# Patient Record
Sex: MALE | Race: WHITE | Employment: OTHER | URBAN - METROPOLITAN AREA
[De-identification: names, ages, dates, MRNs, and addresses within clinical notes are randomized per-mention and may not be internally consistent; named-entity substitution may affect disease eponyms.]

---

## 2017-08-07 RX ORDER — FLUOXETINE 10 MG/1
CAPSULE ORAL
Qty: 90 CAPSULE | Refills: 3 | Status: SHIPPED | OUTPATIENT
Start: 2017-08-07 | End: 2018-08-13 | Stop reason: SDUPTHER

## 2018-03-15 ENCOUNTER — HOSPITAL ENCOUNTER (OUTPATIENT)
Dept: VASCULAR LAB | Age: 83
Discharge: HOME | End: 2018-03-15
Attending: INTERNAL MEDICINE | Admitting: INTERNAL MEDICINE

## 2018-03-15 DIAGNOSIS — R23.0 CYANOSIS: ICD-10-CM

## 2018-04-20 ENCOUNTER — TELEPHONE (OUTPATIENT)
Dept: CASE MANAGEMENT | Age: 83
End: 2018-04-20

## 2018-08-13 RX ORDER — FLUOXETINE 10 MG/1
CAPSULE ORAL
Qty: 90 CAPSULE | Refills: 3 | Status: SHIPPED | OUTPATIENT
Start: 2018-08-13 | End: 2019-03-25

## 2019-03-25 ENCOUNTER — OFFICE VISIT (OUTPATIENT)
Dept: FAMILY MEDICINE CLINIC | Age: 84
End: 2019-03-25
Payer: MEDICARE

## 2019-03-25 VITALS
HEIGHT: 71 IN | WEIGHT: 140.38 LBS | OXYGEN SATURATION: 94 % | HEART RATE: 68 BPM | BODY MASS INDEX: 19.65 KG/M2 | DIASTOLIC BLOOD PRESSURE: 60 MMHG | SYSTOLIC BLOOD PRESSURE: 122 MMHG

## 2019-03-25 DIAGNOSIS — D61.818 PANCYTOPENIA (HCC): ICD-10-CM

## 2019-03-25 DIAGNOSIS — I10 ESSENTIAL HYPERTENSION, BENIGN: Primary | ICD-10-CM

## 2019-03-25 DIAGNOSIS — I25.10 ATHEROSCLEROSIS OF NATIVE CORONARY ARTERY OF NATIVE HEART WITHOUT ANGINA PECTORIS: ICD-10-CM

## 2019-03-25 DIAGNOSIS — E78.2 MIXED HYPERLIPIDEMIA: ICD-10-CM

## 2019-03-25 LAB
A/G RATIO: 1.2 (ref 1.1–2.2)
ACANTHOCYTES: ABNORMAL
ALBUMIN SERPL-MCNC: 4.1 G/DL (ref 3.4–5)
ALP BLD-CCNC: 69 U/L (ref 40–129)
ALT SERPL-CCNC: 16 U/L (ref 10–40)
ANION GAP SERPL CALCULATED.3IONS-SCNC: 10 MMOL/L (ref 3–16)
AST SERPL-CCNC: 29 U/L (ref 15–37)
BASOPHILS ABSOLUTE: 0 K/UL (ref 0–0.2)
BASOPHILS RELATIVE PERCENT: 0.5 %
BILIRUB SERPL-MCNC: 0.6 MG/DL (ref 0–1)
BUN BLDV-MCNC: 24 MG/DL (ref 7–20)
CALCIUM SERPL-MCNC: 9.4 MG/DL (ref 8.3–10.6)
CHLORIDE BLD-SCNC: 104 MMOL/L (ref 99–110)
CHOLESTEROL, TOTAL: 86 MG/DL (ref 0–199)
CO2: 27 MMOL/L (ref 21–32)
CREAT SERPL-MCNC: 1.2 MG/DL (ref 0.8–1.3)
EOSINOPHILS ABSOLUTE: 0 K/UL (ref 0–0.6)
EOSINOPHILS RELATIVE PERCENT: 0.2 %
GFR AFRICAN AMERICAN: >60
GFR NON-AFRICAN AMERICAN: 58
GLOBULIN: 3.3 G/DL
GLUCOSE BLD-MCNC: 94 MG/DL (ref 70–99)
HCT VFR BLD CALC: 40.1 % (ref 40.5–52.5)
HDLC SERPL-MCNC: 43 MG/DL (ref 40–60)
HEMATOLOGY PATH CONSULT: YES
HEMOGLOBIN: 13 G/DL (ref 13.5–17.5)
LDL CHOLESTEROL CALCULATED: 34 MG/DL
LYMPHOCYTES ABSOLUTE: 0.9 K/UL (ref 1–5.1)
LYMPHOCYTES RELATIVE PERCENT: 33.5 %
MCH RBC QN AUTO: 32 PG (ref 26–34)
MCHC RBC AUTO-ENTMCNC: 32.5 G/DL (ref 31–36)
MCV RBC AUTO: 98.3 FL (ref 80–100)
MONOCYTES ABSOLUTE: 0.8 K/UL (ref 0–1.3)
MONOCYTES RELATIVE PERCENT: 32.2 %
NEUTROPHILS ABSOLUTE: 0.9 K/UL (ref 1.7–7.7)
NEUTROPHILS RELATIVE PERCENT: 33.6 %
PDW BLD-RTO: 14.1 % (ref 12.4–15.4)
PLATELET # BLD: 54 K/UL (ref 135–450)
PLATELET SLIDE REVIEW: ABNORMAL
PMV BLD AUTO: 11.3 FL (ref 5–10.5)
POTASSIUM SERPL-SCNC: 4.4 MMOL/L (ref 3.5–5.1)
RBC # BLD: 4.07 M/UL (ref 4.2–5.9)
SLIDE REVIEW: ABNORMAL
SODIUM BLD-SCNC: 141 MMOL/L (ref 136–145)
TOTAL PROTEIN: 7.4 G/DL (ref 6.4–8.2)
TRIGL SERPL-MCNC: 46 MG/DL (ref 0–150)
VLDLC SERPL CALC-MCNC: 9 MG/DL
WBC # BLD: 2.6 K/UL (ref 4–11)

## 2019-03-25 PROCEDURE — G8427 DOCREV CUR MEDS BY ELIG CLIN: HCPCS | Performed by: FAMILY MEDICINE

## 2019-03-25 PROCEDURE — 1036F TOBACCO NON-USER: CPT | Performed by: FAMILY MEDICINE

## 2019-03-25 PROCEDURE — G8484 FLU IMMUNIZE NO ADMIN: HCPCS | Performed by: FAMILY MEDICINE

## 2019-03-25 PROCEDURE — 99214 OFFICE O/P EST MOD 30 MIN: CPT | Performed by: FAMILY MEDICINE

## 2019-03-25 PROCEDURE — 4040F PNEUMOC VAC/ADMIN/RCVD: CPT | Performed by: FAMILY MEDICINE

## 2019-03-25 PROCEDURE — 1123F ACP DISCUSS/DSCN MKR DOCD: CPT | Performed by: FAMILY MEDICINE

## 2019-03-25 PROCEDURE — 36415 COLL VENOUS BLD VENIPUNCTURE: CPT | Performed by: FAMILY MEDICINE

## 2019-03-25 PROCEDURE — G8598 ASA/ANTIPLAT THER USED: HCPCS | Performed by: FAMILY MEDICINE

## 2019-03-25 PROCEDURE — G8420 CALC BMI NORM PARAMETERS: HCPCS | Performed by: FAMILY MEDICINE

## 2019-03-25 ASSESSMENT — PATIENT HEALTH QUESTIONNAIRE - PHQ9
SUM OF ALL RESPONSES TO PHQ QUESTIONS 1-9: 0
1. LITTLE INTEREST OR PLEASURE IN DOING THINGS: 0
SUM OF ALL RESPONSES TO PHQ QUESTIONS 1-9: 0
SUM OF ALL RESPONSES TO PHQ9 QUESTIONS 1 & 2: 0
2. FEELING DOWN, DEPRESSED OR HOPELESS: 0

## 2019-03-25 NOTE — PROGRESS NOTES
Chief Complaint   Patient presents with    Hyperlipidemia    Hypertension     Family History   Problem Relation Age of Onset    Cancer Mother         breast cancer    Coronary Art Dis Father     Cancer Brother         thyroid     Social History     Socioeconomic History    Marital status:      Spouse name: Roxy Cruz Number of children: 6    Years of education: Not on file    Highest education level: Not on file   Occupational History    Occupation: writing   Social Needs    Financial resource strain: Not on file    Food insecurity:     Worry: Not on file     Inability: Not on file    Transportation needs:     Medical: Not on file     Non-medical: Not on file   Tobacco Use    Smoking status: Never Smoker    Smokeless tobacco: Never Used   Substance and Sexual Activity    Alcohol use: No    Drug use: Not on file    Sexual activity: Not on file   Lifestyle    Physical activity:     Days per week: Not on file     Minutes per session: Not on file    Stress: Not on file   Relationships    Social connections:     Talks on phone: Not on file     Gets together: Not on file     Attends Hindu service: Not on file     Active member of club or organization: Not on file     Attends meetings of clubs or organizations: Not on file     Relationship status: Not on file    Intimate partner violence:     Fear of current or ex partner: Not on file     Emotionally abused: Not on file     Physically abused: Not on file     Forced sexual activity: Not on file   Other Topics Concern    Not on file   Social History Narrative    Not on file       Current Outpatient Medications:     aspirin 81 MG EC tablet, Take 81 mg by mouth daily. , Disp: , Rfl:     metoprolol (LOPRESSOR) 25 MG tablet, Take 25 mg by mouth 2 times daily.  1/2 by mouth twice a day , Disp: , Rfl:     simvastatin (ZOCOR) 20 MG tablet, Take 20 mg by mouth nightly.  , Disp: , Rfl:   Allergies   Allergen Reactions    Altace [Ramipril] Patient Active Problem List   Diagnosis    Osteoarthritis of multiple joints    Pain in joint, shoulder region    Elevated prostate specific antigen (PSA)    Dyshidrosis    Essential hypertension, benign    Coronary atherosclerosis of native coronary artery    Allergic rhinitis due to other allergen    Mixed hyperlipidemia    Sleep apnea    Pancytopenia (HCC)    Macrocytosis    Thrombocytopenia (HCC)       HPI / ROS: Asher Rivas presents for evaluation and management of :    # CAD denies CP/SOB  # pancytopenia follows Dr. Victorino Michaud        Objective   Wt Readings from Last 3 Encounters:   03/25/19 140 lb 6 oz (63.7 kg)   07/18/17 131 lb (59.4 kg)   03/14/17 134 lb (60.8 kg)       A&O  /60   Pulse 68   Ht 5' 10.86\" (1.8 m)   Wt 140 lb 6 oz (63.7 kg)   SpO2 94%   BMI 19.66 kg/m²   Eyes no scleral icterus  Skin no rash no jaundice  Neck no TMG no LAD  Car reg no MGR  Lungs CTA  abd benign soft  Ext no pitting edema  Psych: Judgement and insight are intact, no pressured speech; no psychomotor retardation or agitation; affect and mood congruent     Diagnosis Orders   1. Essential hypertension, benign  Comprehensive Metabolic Panel    at goal check renal   2. Atherosclerosis of native coronary artery of native heart without angina pectoris      stable cont risk reducing meds   3. Mixed hyperlipidemia  Comprehensive Metabolic Panel    Lipid Panel    LFts lipids on statin   4.  Pancytopenia (HCC)  CBC Auto Differential    repeat CBC and cont woth hem onc as needed     Orders Placed This Encounter   Procedures    Comprehensive Metabolic Panel    Lipid Panel     Order Specific Question:   Is Patient Fasting?/# of Hours     Answer:   yes    CBC Auto Differential    Path Review, Smear

## 2019-03-26 ENCOUNTER — TELEPHONE (OUTPATIENT)
Dept: FAMILY MEDICINE CLINIC | Age: 84
End: 2019-03-26

## 2019-03-26 LAB — HEMATOLOGY PATH CONSULT: NORMAL

## 2019-04-26 ENCOUNTER — TELEPHONE (OUTPATIENT)
Dept: FAMILY MEDICINE CLINIC | Age: 84
End: 2019-04-26

## 2019-08-26 ENCOUNTER — OFFICE VISIT (OUTPATIENT)
Dept: FAMILY MEDICINE CLINIC | Age: 84
End: 2019-08-26
Payer: MEDICARE

## 2019-08-26 VITALS
DIASTOLIC BLOOD PRESSURE: 66 MMHG | HEART RATE: 50 BPM | WEIGHT: 142.4 LBS | BODY MASS INDEX: 19.94 KG/M2 | OXYGEN SATURATION: 97 % | HEIGHT: 71 IN | SYSTOLIC BLOOD PRESSURE: 126 MMHG

## 2019-08-26 DIAGNOSIS — I25.10 ATHEROSCLEROSIS OF NATIVE CORONARY ARTERY OF NATIVE HEART WITHOUT ANGINA PECTORIS: ICD-10-CM

## 2019-08-26 DIAGNOSIS — I10 ESSENTIAL HYPERTENSION, BENIGN: Primary | ICD-10-CM

## 2019-08-26 DIAGNOSIS — Z23 NEED FOR VACCINATION WITH 13-POLYVALENT PNEUMOCOCCAL CONJUGATE VACCINE: ICD-10-CM

## 2019-08-26 DIAGNOSIS — E78.2 MIXED HYPERLIPIDEMIA: ICD-10-CM

## 2019-08-26 PROCEDURE — 1036F TOBACCO NON-USER: CPT | Performed by: FAMILY MEDICINE

## 2019-08-26 PROCEDURE — G8427 DOCREV CUR MEDS BY ELIG CLIN: HCPCS | Performed by: FAMILY MEDICINE

## 2019-08-26 PROCEDURE — 90670 PCV13 VACCINE IM: CPT | Performed by: FAMILY MEDICINE

## 2019-08-26 PROCEDURE — G8420 CALC BMI NORM PARAMETERS: HCPCS | Performed by: FAMILY MEDICINE

## 2019-08-26 PROCEDURE — G0009 ADMIN PNEUMOCOCCAL VACCINE: HCPCS | Performed by: FAMILY MEDICINE

## 2019-08-26 PROCEDURE — 4040F PNEUMOC VAC/ADMIN/RCVD: CPT | Performed by: FAMILY MEDICINE

## 2019-08-26 PROCEDURE — 99214 OFFICE O/P EST MOD 30 MIN: CPT | Performed by: FAMILY MEDICINE

## 2019-08-26 PROCEDURE — 1123F ACP DISCUSS/DSCN MKR DOCD: CPT | Performed by: FAMILY MEDICINE

## 2019-08-26 PROCEDURE — 36415 COLL VENOUS BLD VENIPUNCTURE: CPT | Performed by: FAMILY MEDICINE

## 2019-08-26 PROCEDURE — G8598 ASA/ANTIPLAT THER USED: HCPCS | Performed by: FAMILY MEDICINE

## 2019-08-26 NOTE — PROGRESS NOTES
[Ramipril]        Patient Active Problem List   Diagnosis    Osteoarthritis of multiple joints    Pain in joint, shoulder region    Elevated prostate specific antigen (PSA)    Dyshidrosis    Essential hypertension, benign    Coronary atherosclerosis of native coronary artery    Allergic rhinitis due to other allergen    Mixed hyperlipidemia    Sleep apnea    Pancytopenia ([address]    Macrocytosis       HPI / ROS: Chidi Handley presents for evaluation and management of :    # CAD denies CP/SOB had appt Dr. Linda Friedman last WED noting changes \"he seemed pleased\"    # pancytopenia follows Dr. Damon Calloway  # HTN eric meds no CP/SOB  # hyperlipidemia on statin lipids UTD  Lab Results   Component Value Date    LDLCALC 34 03/25/2019         Objective   Wt Readings from Last 3 Encounters:   08/26/19 142 lb 6.4 oz (64.6 kg)   03/25/19 140 lb 6 oz (63.7 kg)   07/18/17 131 lb (59.4 kg)       A&O  /66   Pulse 50   Ht 5' 11\" (1.803 m)   Wt 142 lb 6.4 oz (64.6 kg)   SpO2 97%   BMI 19.86 kg/m²   Eyes no scleral icterus  Skin no rash no jaundice  Neck no TMG no LAD  Car reg no MGR  Lungs CTA  abd benign soft  Ext no pitting edema  Psych: Judgement and insight are intact, no pressured speech; no psychomotor retardation or agitation; affect and mood congruent     Diagnosis Orders   1. Essential hypertension, benign  Comprehensive Metabolic Panel    at goal age > [de-identified] check renal   2. Mixed hyperlipidemia  Comprehensive Metabolic Panel    Lipid Panel    LFTs on statin continue   3. Atherosclerosis of native coronary artery of native heart without angina pectoris      stable cont risk reducing meds   4.  Need for vaccination with 13-polyvalent pneumococcal conjugate vaccine  PREVNAR 13 IM (Pneumococcal conjugate vaccine 13-valent)     Orders Placed This Encounter   Procedures    PREVNAR 13 IM (Pneumococcal conjugate vaccine 13-valent)    Comprehensive Metabolic Panel    Lipid Panel     Order Specific Question:   Is

## 2019-08-27 LAB
A/G RATIO: 1.7 (ref 1.1–2.2)
ALBUMIN SERPL-MCNC: 4.3 G/DL (ref 3.4–5)
ALP BLD-CCNC: 57 U/L (ref 40–129)
ALT SERPL-CCNC: 22 U/L (ref 10–40)
ANION GAP SERPL CALCULATED.3IONS-SCNC: 12 MMOL/L (ref 3–16)
AST SERPL-CCNC: 26 U/L (ref 15–37)
BILIRUB SERPL-MCNC: 0.4 MG/DL (ref 0–1)
BUN BLDV-MCNC: 29 MG/DL (ref 7–20)
CALCIUM SERPL-MCNC: 9.4 MG/DL (ref 8.3–10.6)
CHLORIDE BLD-SCNC: 109 MMOL/L (ref 99–110)
CHOLESTEROL, TOTAL: 78 MG/DL (ref 0–199)
CO2: 24 MMOL/L (ref 21–32)
CREAT SERPL-MCNC: 1.3 MG/DL (ref 0.8–1.3)
GFR AFRICAN AMERICAN: >60
GFR NON-AFRICAN AMERICAN: 53
GLOBULIN: 2.6 G/DL
GLUCOSE BLD-MCNC: 98 MG/DL (ref 70–99)
HDLC SERPL-MCNC: 42 MG/DL (ref 40–60)
LDL CHOLESTEROL CALCULATED: 26 MG/DL
POTASSIUM SERPL-SCNC: 5.4 MMOL/L (ref 3.5–5.1)
SODIUM BLD-SCNC: 145 MMOL/L (ref 136–145)
TOTAL PROTEIN: 6.9 G/DL (ref 6.4–8.2)
TRIGL SERPL-MCNC: 52 MG/DL (ref 0–150)
VLDLC SERPL CALC-MCNC: 10 MG/DL

## 2020-01-14 ENCOUNTER — OFFICE VISIT (OUTPATIENT)
Dept: FAMILY MEDICINE CLINIC | Age: 85
End: 2020-01-14
Payer: MEDICARE

## 2020-01-14 VITALS
SYSTOLIC BLOOD PRESSURE: 148 MMHG | BODY MASS INDEX: 19.43 KG/M2 | DIASTOLIC BLOOD PRESSURE: 81 MMHG | HEIGHT: 71 IN | OXYGEN SATURATION: 98 % | RESPIRATION RATE: 17 BRPM | HEART RATE: 74 BPM | WEIGHT: 138.8 LBS

## 2020-01-14 LAB
A/G RATIO: 1.9 (ref 1.1–2.2)
ALBUMIN SERPL-MCNC: 5 G/DL (ref 3.4–5)
ALP BLD-CCNC: 60 U/L (ref 40–129)
ALT SERPL-CCNC: 21 U/L (ref 10–40)
ANION GAP SERPL CALCULATED.3IONS-SCNC: 16 MMOL/L (ref 3–16)
AST SERPL-CCNC: 31 U/L (ref 15–37)
BASOPHILS ABSOLUTE: 0 K/UL (ref 0–0.2)
BASOPHILS RELATIVE PERCENT: 0.6 %
BILIRUB SERPL-MCNC: 0.8 MG/DL (ref 0–1)
BUN BLDV-MCNC: 26 MG/DL (ref 7–20)
CALCIUM SERPL-MCNC: 9.7 MG/DL (ref 8.3–10.6)
CHLORIDE BLD-SCNC: 102 MMOL/L (ref 99–110)
CO2: 23 MMOL/L (ref 21–32)
CREAT SERPL-MCNC: 1.1 MG/DL (ref 0.8–1.3)
EOSINOPHILS ABSOLUTE: 0 K/UL (ref 0–0.6)
EOSINOPHILS RELATIVE PERCENT: 0.4 %
GFR AFRICAN AMERICAN: >60
GFR NON-AFRICAN AMERICAN: >60
GLOBULIN: 2.7 G/DL
GLUCOSE BLD-MCNC: 132 MG/DL (ref 70–99)
HCT VFR BLD CALC: 37.6 % (ref 40.5–52.5)
HEMOGLOBIN: 12.5 G/DL (ref 13.5–17.5)
LYMPHOCYTES ABSOLUTE: 0.7 K/UL (ref 1–5.1)
LYMPHOCYTES RELATIVE PERCENT: 30 %
MCH RBC QN AUTO: 33.2 PG (ref 26–34)
MCHC RBC AUTO-ENTMCNC: 33.2 G/DL (ref 31–36)
MCV RBC AUTO: 99.9 FL (ref 80–100)
MONOCYTES ABSOLUTE: 0.4 K/UL (ref 0–1.3)
MONOCYTES RELATIVE PERCENT: 17.2 %
NEUTROPHILS ABSOLUTE: 1.2 K/UL (ref 1.7–7.7)
NEUTROPHILS RELATIVE PERCENT: 51.8 %
PDW BLD-RTO: 13.8 % (ref 12.4–15.4)
PLATELET # BLD: 46 K/UL (ref 135–450)
PMV BLD AUTO: 11.6 FL (ref 5–10.5)
POTASSIUM SERPL-SCNC: 4.5 MMOL/L (ref 3.5–5.1)
RBC # BLD: 3.76 M/UL (ref 4.2–5.9)
SLIDE REVIEW: ABNORMAL
SODIUM BLD-SCNC: 141 MMOL/L (ref 136–145)
TOTAL PROTEIN: 7.7 G/DL (ref 6.4–8.2)
WBC # BLD: 2.3 K/UL (ref 4–11)

## 2020-01-14 PROCEDURE — 99214 OFFICE O/P EST MOD 30 MIN: CPT | Performed by: FAMILY MEDICINE

## 2020-01-14 PROCEDURE — 4040F PNEUMOC VAC/ADMIN/RCVD: CPT | Performed by: FAMILY MEDICINE

## 2020-01-14 PROCEDURE — G8420 CALC BMI NORM PARAMETERS: HCPCS | Performed by: FAMILY MEDICINE

## 2020-01-14 PROCEDURE — 1123F ACP DISCUSS/DSCN MKR DOCD: CPT | Performed by: FAMILY MEDICINE

## 2020-01-14 PROCEDURE — 1036F TOBACCO NON-USER: CPT | Performed by: FAMILY MEDICINE

## 2020-01-14 PROCEDURE — G8484 FLU IMMUNIZE NO ADMIN: HCPCS | Performed by: FAMILY MEDICINE

## 2020-01-14 PROCEDURE — 36415 COLL VENOUS BLD VENIPUNCTURE: CPT | Performed by: FAMILY MEDICINE

## 2020-01-14 PROCEDURE — G8427 DOCREV CUR MEDS BY ELIG CLIN: HCPCS | Performed by: FAMILY MEDICINE

## 2020-01-14 ASSESSMENT — PATIENT HEALTH QUESTIONNAIRE - PHQ9: DEPRESSION UNABLE TO ASSESS: URGENT/EMERGENT SITUATION

## 2020-01-14 NOTE — PROGRESS NOTES
Chief Complaint   Patient presents with    Hyperlipidemia    Hypertension    Pain     1/4/2020, sensation in chest (thought he was having MI), went away after a few mins, would not let spouse call EMT      Family History   Problem Relation Age of Onset    Cancer Mother         breast cancer    Coronary Art Dis Father     Cancer Brother         thyroid     Social History     Socioeconomic History    Marital status:      Spouse name: Flash Covington Number of children: 6    Years of education: Not on file    Highest education level: Not on file   Occupational History    Occupation: writing   Social Needs    Financial resource strain: Not on file    Food insecurity:     Worry: Not on file     Inability: Not on file    Transportation needs:     Medical: Not on file     Non-medical: Not on file   Tobacco Use    Smoking status: Never Smoker    Smokeless tobacco: Never Used   Substance and Sexual Activity    Alcohol use: No    Drug use: Not on file    Sexual activity: Not on file   Lifestyle    Physical activity:     Days per week: Not on file     Minutes per session: Not on file    Stress: Not on file   Relationships    Social connections:     Talks on phone: Not on file     Gets together: Not on file     Attends Jainism service: Not on file     Active member of club or organization: Not on file     Attends meetings of clubs or organizations: Not on file     Relationship status: Not on file    Intimate partner violence:     Fear of current or ex partner: Not on file     Emotionally abused: Not on file     Physically abused: Not on file     Forced sexual activity: Not on file   Other Topics Concern    Not on file   Social History Narrative    Not on file       Current Outpatient Medications:     aspirin 81 MG EC tablet, Take 81 mg by mouth daily. , Disp: , Rfl:     metoprolol (LOPRESSOR) 25 MG tablet, Take 25 mg by mouth 2 times daily.  1/2 by mouth twice a day , Disp: , Rfl:     simvastatin

## 2020-02-07 ENCOUNTER — HOSPITAL ENCOUNTER (OUTPATIENT)
Dept: CARDIAC REHAB | Age: 85
Setting detail: THERAPIES SERIES
Discharge: HOME | End: 2020-02-07
Payer: MEDICARE

## 2020-02-07 RX ORDER — NITROGLYCERIN 0.4 MG/1
0.4 TABLET SUBLINGUAL PRN
COMMUNITY
Start: 2020-01-15

## 2020-02-07 RX ORDER — CLOPIDOGREL BISULFATE 75 MG/1
75 TABLET ORAL DAILY
COMMUNITY
Start: 2020-01-25 | End: 2022-04-20 | Stop reason: ALTCHOICE

## 2020-02-10 ENCOUNTER — HOSPITAL ENCOUNTER (OUTPATIENT)
Dept: CARDIAC REHAB | Age: 85
Setting detail: THERAPIES SERIES
Discharge: HOME | End: 2020-02-10
Payer: MEDICARE

## 2020-02-10 PROCEDURE — 93798 PHYS/QHP OP CAR RHAB W/ECG: CPT

## 2020-02-12 ENCOUNTER — HOSPITAL ENCOUNTER (OUTPATIENT)
Dept: CARDIAC REHAB | Age: 85
Setting detail: THERAPIES SERIES
Discharge: HOME | End: 2020-02-12
Payer: MEDICARE

## 2020-02-12 PROCEDURE — 93798 PHYS/QHP OP CAR RHAB W/ECG: CPT

## 2020-02-14 ENCOUNTER — HOSPITAL ENCOUNTER (OUTPATIENT)
Dept: CARDIAC REHAB | Age: 85
Setting detail: THERAPIES SERIES
Discharge: HOME | End: 2020-02-14
Payer: MEDICARE

## 2020-02-14 PROCEDURE — 93798 PHYS/QHP OP CAR RHAB W/ECG: CPT

## 2020-02-17 ENCOUNTER — HOSPITAL ENCOUNTER (OUTPATIENT)
Dept: CARDIAC REHAB | Age: 85
Setting detail: THERAPIES SERIES
Discharge: HOME | End: 2020-02-17
Payer: MEDICARE

## 2020-02-17 PROCEDURE — 93798 PHYS/QHP OP CAR RHAB W/ECG: CPT

## 2020-02-21 ENCOUNTER — HOSPITAL ENCOUNTER (OUTPATIENT)
Dept: CARDIAC REHAB | Age: 85
Setting detail: THERAPIES SERIES
Discharge: HOME | End: 2020-02-21
Payer: MEDICARE

## 2020-02-21 PROCEDURE — 93798 PHYS/QHP OP CAR RHAB W/ECG: CPT

## 2020-02-24 ENCOUNTER — HOSPITAL ENCOUNTER (OUTPATIENT)
Dept: CARDIAC REHAB | Age: 85
Setting detail: THERAPIES SERIES
Discharge: HOME | End: 2020-02-24
Payer: MEDICARE

## 2020-02-24 PROCEDURE — 93798 PHYS/QHP OP CAR RHAB W/ECG: CPT

## 2020-02-26 ENCOUNTER — HOSPITAL ENCOUNTER (OUTPATIENT)
Dept: CARDIAC REHAB | Age: 85
Setting detail: THERAPIES SERIES
Discharge: HOME | End: 2020-02-26
Payer: MEDICARE

## 2020-02-26 PROCEDURE — 93798 PHYS/QHP OP CAR RHAB W/ECG: CPT

## 2020-02-28 ENCOUNTER — HOSPITAL ENCOUNTER (OUTPATIENT)
Dept: CARDIAC REHAB | Age: 85
Setting detail: THERAPIES SERIES
Discharge: HOME | End: 2020-02-28
Payer: MEDICARE

## 2020-02-28 PROCEDURE — 93798 PHYS/QHP OP CAR RHAB W/ECG: CPT

## 2020-03-02 ENCOUNTER — HOSPITAL ENCOUNTER (OUTPATIENT)
Dept: CARDIAC REHAB | Age: 85
Setting detail: THERAPIES SERIES
Discharge: HOME | End: 2020-03-02
Payer: MEDICARE

## 2020-03-02 PROCEDURE — 93798 PHYS/QHP OP CAR RHAB W/ECG: CPT

## 2020-03-04 ENCOUNTER — HOSPITAL ENCOUNTER (OUTPATIENT)
Dept: CARDIAC REHAB | Age: 85
Setting detail: THERAPIES SERIES
Discharge: HOME | End: 2020-03-04
Payer: MEDICARE

## 2020-03-04 PROCEDURE — 93798 PHYS/QHP OP CAR RHAB W/ECG: CPT

## 2020-03-06 ENCOUNTER — HOSPITAL ENCOUNTER (OUTPATIENT)
Dept: CARDIAC REHAB | Age: 85
Setting detail: THERAPIES SERIES
Discharge: HOME | End: 2020-03-06
Payer: MEDICARE

## 2020-03-06 PROCEDURE — 93798 PHYS/QHP OP CAR RHAB W/ECG: CPT

## 2020-03-09 ENCOUNTER — HOSPITAL ENCOUNTER (OUTPATIENT)
Dept: CARDIAC REHAB | Age: 85
Setting detail: THERAPIES SERIES
Discharge: HOME | End: 2020-03-09
Payer: MEDICARE

## 2020-03-09 PROCEDURE — 93798 PHYS/QHP OP CAR RHAB W/ECG: CPT

## 2020-03-11 ENCOUNTER — HOSPITAL ENCOUNTER (OUTPATIENT)
Dept: CARDIAC REHAB | Age: 85
Setting detail: THERAPIES SERIES
Discharge: HOME | End: 2020-03-11
Payer: MEDICARE

## 2020-03-11 PROCEDURE — 93798 PHYS/QHP OP CAR RHAB W/ECG: CPT

## 2020-03-13 ENCOUNTER — HOSPITAL ENCOUNTER (OUTPATIENT)
Dept: CARDIAC REHAB | Age: 85
Setting detail: THERAPIES SERIES
Discharge: HOME | End: 2020-03-13
Payer: MEDICARE

## 2020-03-13 PROCEDURE — 93798 PHYS/QHP OP CAR RHAB W/ECG: CPT

## 2020-03-16 ENCOUNTER — HOSPITAL ENCOUNTER (OUTPATIENT)
Dept: CARDIAC REHAB | Age: 85
Setting detail: THERAPIES SERIES
Discharge: HOME | End: 2020-03-16
Payer: MEDICARE

## 2020-03-16 PROCEDURE — 93798 PHYS/QHP OP CAR RHAB W/ECG: CPT

## 2020-03-20 ENCOUNTER — APPOINTMENT (OUTPATIENT)
Dept: CARDIAC REHAB | Age: 85
End: 2020-03-20
Payer: MEDICARE

## 2020-03-23 ENCOUNTER — APPOINTMENT (OUTPATIENT)
Dept: CARDIAC REHAB | Age: 85
End: 2020-03-23
Payer: MEDICARE

## 2020-03-25 ENCOUNTER — APPOINTMENT (OUTPATIENT)
Dept: CARDIAC REHAB | Age: 85
End: 2020-03-25
Payer: MEDICARE

## 2020-03-27 ENCOUNTER — APPOINTMENT (OUTPATIENT)
Dept: CARDIAC REHAB | Age: 85
End: 2020-03-27
Payer: MEDICARE

## 2020-03-30 ENCOUNTER — APPOINTMENT (OUTPATIENT)
Dept: CARDIAC REHAB | Age: 85
End: 2020-03-30
Payer: MEDICARE

## 2020-07-01 ENCOUNTER — HOSPITAL ENCOUNTER (OUTPATIENT)
Dept: CARDIAC REHAB | Age: 85
Setting detail: THERAPIES SERIES
Discharge: HOME | End: 2020-07-01
Payer: MEDICARE

## 2020-07-01 PROCEDURE — 93798 PHYS/QHP OP CAR RHAB W/ECG: CPT

## 2020-07-06 ENCOUNTER — HOSPITAL ENCOUNTER (OUTPATIENT)
Dept: CARDIAC REHAB | Age: 85
Setting detail: THERAPIES SERIES
Discharge: HOME | End: 2020-07-06
Payer: MEDICARE

## 2020-07-06 PROCEDURE — 93798 PHYS/QHP OP CAR RHAB W/ECG: CPT

## 2020-07-08 ENCOUNTER — HOSPITAL ENCOUNTER (OUTPATIENT)
Dept: CARDIAC REHAB | Age: 85
Setting detail: THERAPIES SERIES
Discharge: HOME | End: 2020-07-08
Payer: MEDICARE

## 2020-07-08 PROCEDURE — 93798 PHYS/QHP OP CAR RHAB W/ECG: CPT

## 2020-07-22 ENCOUNTER — HOSPITAL ENCOUNTER (OUTPATIENT)
Dept: CARDIAC REHAB | Age: 85
Setting detail: THERAPIES SERIES
End: 2020-07-22
Payer: MEDICARE

## 2020-07-24 ENCOUNTER — TELEPHONE (OUTPATIENT)
Dept: FAMILY MEDICINE CLINIC | Age: 85
End: 2020-07-24

## 2020-07-24 NOTE — TELEPHONE ENCOUNTER
PT needing a hearing evaluation referral faxed over to [id number].        KZB:[phone]     PT Has hearing appt 07/30/20

## 2020-07-27 ENCOUNTER — HOSPITAL ENCOUNTER (OUTPATIENT)
Dept: CARDIAC REHAB | Age: 85
Setting detail: THERAPIES SERIES
Discharge: HOME | End: 2020-07-27
Payer: MEDICARE

## 2020-07-27 PROCEDURE — 93798 PHYS/QHP OP CAR RHAB W/ECG: CPT

## 2020-07-29 ENCOUNTER — HOSPITAL ENCOUNTER (OUTPATIENT)
Dept: CARDIAC REHAB | Age: 85
Setting detail: THERAPIES SERIES
Discharge: HOME | End: 2020-07-29
Payer: MEDICARE

## 2020-07-29 PROCEDURE — 93798 PHYS/QHP OP CAR RHAB W/ECG: CPT

## 2020-07-31 ENCOUNTER — HOSPITAL ENCOUNTER (OUTPATIENT)
Dept: CARDIAC REHAB | Age: 85
Setting detail: THERAPIES SERIES
Discharge: HOME | End: 2020-07-31
Payer: MEDICARE

## 2020-07-31 PROCEDURE — 93798 PHYS/QHP OP CAR RHAB W/ECG: CPT

## 2020-08-03 ENCOUNTER — HOSPITAL ENCOUNTER (OUTPATIENT)
Dept: CARDIAC REHAB | Age: 85
Setting detail: THERAPIES SERIES
Discharge: HOME | End: 2020-08-03
Payer: MEDICARE

## 2020-08-03 PROCEDURE — 93798 PHYS/QHP OP CAR RHAB W/ECG: CPT

## 2020-08-05 ENCOUNTER — HOSPITAL ENCOUNTER (OUTPATIENT)
Dept: CARDIAC REHAB | Age: 85
Setting detail: THERAPIES SERIES
Discharge: HOME | End: 2020-08-05
Payer: MEDICARE

## 2020-08-05 PROCEDURE — 93798 PHYS/QHP OP CAR RHAB W/ECG: CPT

## 2020-08-07 ENCOUNTER — HOSPITAL ENCOUNTER (OUTPATIENT)
Dept: CARDIAC REHAB | Age: 85
Setting detail: THERAPIES SERIES
Discharge: HOME | End: 2020-08-07
Payer: MEDICARE

## 2020-08-07 PROCEDURE — 93798 PHYS/QHP OP CAR RHAB W/ECG: CPT

## 2020-08-10 ENCOUNTER — HOSPITAL ENCOUNTER (OUTPATIENT)
Dept: CARDIAC REHAB | Age: 85
Setting detail: THERAPIES SERIES
Discharge: HOME | End: 2020-08-10
Payer: MEDICARE

## 2020-08-10 PROCEDURE — 93798 PHYS/QHP OP CAR RHAB W/ECG: CPT

## 2020-08-12 ENCOUNTER — HOSPITAL ENCOUNTER (OUTPATIENT)
Dept: CARDIAC REHAB | Age: 85
Setting detail: THERAPIES SERIES
Discharge: HOME | End: 2020-08-12
Payer: MEDICARE

## 2020-08-12 PROCEDURE — 93798 PHYS/QHP OP CAR RHAB W/ECG: CPT

## 2020-08-14 ENCOUNTER — HOSPITAL ENCOUNTER (OUTPATIENT)
Dept: CARDIAC REHAB | Age: 85
Setting detail: THERAPIES SERIES
Discharge: HOME | End: 2020-08-14
Payer: MEDICARE

## 2020-08-14 PROCEDURE — 93798 PHYS/QHP OP CAR RHAB W/ECG: CPT

## 2020-08-17 ENCOUNTER — HOSPITAL ENCOUNTER (OUTPATIENT)
Dept: CARDIAC REHAB | Age: 85
Setting detail: THERAPIES SERIES
Discharge: HOME | End: 2020-08-17
Payer: MEDICARE

## 2020-08-17 PROCEDURE — 93798 PHYS/QHP OP CAR RHAB W/ECG: CPT

## 2020-08-19 ENCOUNTER — HOSPITAL ENCOUNTER (OUTPATIENT)
Dept: CARDIAC REHAB | Age: 85
Setting detail: THERAPIES SERIES
Discharge: HOME | End: 2020-08-19
Payer: MEDICARE

## 2020-08-19 PROCEDURE — 93798 PHYS/QHP OP CAR RHAB W/ECG: CPT

## 2020-08-21 ENCOUNTER — HOSPITAL ENCOUNTER (OUTPATIENT)
Dept: CARDIAC REHAB | Age: 85
Setting detail: THERAPIES SERIES
End: 2020-08-21
Payer: MEDICARE

## 2020-08-24 ENCOUNTER — HOSPITAL ENCOUNTER (OUTPATIENT)
Dept: CARDIAC REHAB | Age: 85
Setting detail: THERAPIES SERIES
Discharge: HOME | End: 2020-08-24
Payer: MEDICARE

## 2020-08-24 PROCEDURE — 93798 PHYS/QHP OP CAR RHAB W/ECG: CPT

## 2020-08-26 ENCOUNTER — HOSPITAL ENCOUNTER (OUTPATIENT)
Dept: CARDIAC REHAB | Age: 85
Setting detail: THERAPIES SERIES
Discharge: HOME | End: 2020-08-26
Payer: MEDICARE

## 2020-08-26 PROCEDURE — 93798 PHYS/QHP OP CAR RHAB W/ECG: CPT

## 2020-08-28 ENCOUNTER — HOSPITAL ENCOUNTER (OUTPATIENT)
Dept: CARDIAC REHAB | Age: 85
Setting detail: THERAPIES SERIES
Discharge: HOME | End: 2020-08-28
Payer: MEDICARE

## 2020-08-28 PROCEDURE — 93798 PHYS/QHP OP CAR RHAB W/ECG: CPT

## 2020-10-13 ENCOUNTER — NURSE ONLY (OUTPATIENT)
Dept: FAMILY MEDICINE CLINIC | Age: 85
End: 2020-10-13

## 2020-10-13 ENCOUNTER — TELEPHONE (OUTPATIENT)
Dept: FAMILY MEDICINE CLINIC | Age: 85
End: 2020-10-13

## 2020-10-13 NOTE — TELEPHONE ENCOUNTER
----- Message from Cristian Bright sent at 10/13/2020 11:04 AM EDT -----  Subject: Message to Provider    QUESTIONS  Information for Provider? patient has a piece of soap stuck in his ear   needs to be seen today please advise   ---------------------------------------------------------------------------  --------------  CALL BACK INFO  What is the best way for the office to contact you? OK to leave message on   voicemail  Do not leave any message   patient will call back for answer  Preferred Call Back Phone Number? [phone]  ---------------------------------------------------------------------------  --------------  SCRIPT ANSWERS  Relationship to Patient? Other  Representative Name? wife   Is the Representative on the appropriate HIPAA document in Epic?  Yes

## 2020-10-13 NOTE — PROGRESS NOTES
Patient came in today for a lab visit after seeing audiologist, he believes he has soap in his ear, this has happened once before, I irrigated his right ear with two bottles of water- the first one containing hydrogen peroxide. I did get soap \"chips\" out of his ear. Vannessa Mcdowell student did look at patient's ear during and after irrigation and followed up with . Patient put in his hearing aid after and was able to hear quite well.

## 2021-01-25 ENCOUNTER — TELEPHONE (OUTPATIENT)
Dept: FAMILY MEDICINE CLINIC | Age: 86
End: 2021-01-25

## 2021-01-25 NOTE — TELEPHONE ENCOUNTER
Why does ne need a letter stating this ? He may get vaccine if he wants; I am assuming he does want it ? Very unlikely to have an issue with this regardless of prior vaccine issue - - it only has 2 ingredients - a lipid droplet easily processed by cells with the mRNA protein inside and unexposed. There is no live anything or any other substance in the vaccine.

## 2021-01-25 NOTE — TELEPHONE ENCOUNTER
PT's wife called in stating that PT is getting his COVID vaccine on Friday through [facility]. She states that 50 years ago PT had a reaction to the typhoid vaccine and he needs a letter stating this to take with him on Friday. She adv that she is getting her vaccine done Thursday through [facility] and can  the letter on her way to get hers.     Please call back: [phone]

## 2021-01-27 ENCOUNTER — TELEPHONE (OUTPATIENT)
Dept: FAMILY MEDICINE CLINIC | Age: 86
End: 2021-01-27

## 2021-01-27 NOTE — TELEPHONE ENCOUNTER
Wife calling again RE: previous message. He needs a note in order to receive the covid vaccine. Please advise:     Note     PT's wife called in stating that PT is getting his COVID vaccine on Friday through [facility]. She states that 50 years ago PT had a reaction to the typhoid vaccine and he needs a letter stating this to take with him on Friday.      She adv that she is getting her vaccine done Thursday through [facility] and can  the letter on her way to get hers.     Please call back: [phone]               1/25/21 1:19 PM  Tab Wright routed this conversation to [employer] [facility] Staff            1/25/21 1:31 PM  Jennie Malagonedelmira Cox routed this conversation to MD Deepti Small MD         1/25/21 1:38 PM  Note     Why does ne need a letter stating this ? He may get vaccine if he wants; I am assuming he does want it ?     Very unlikely to have an issue with this regardless of prior vaccine issue - - it only has 2 ingredients - a lipid droplet easily processed by cells with the mRNA protein inside and unexposed. There is no live anything or any other substance in the vaccine.               1/25/21 1:38 PM  Deepti Blount MD routed this conversation to [facility] Staff   Natalio Macario Cueto         1/25/21 1:59 PM  Note     Left VM for PT to return call                1/25/21 3:19 PM    Lee Ann Fisher (Emergency Contact) contacted Angy León         1/25/21 3:22 PM  Note     PT's wife called back in regarding her earlier message.  She says that when they registered PT online to get scheduled for the vaccine that there was a question on the online form that asked if PT ever had a reaction to a previous vaccine and when they checked yes a pop up box appeared stating that they would need to bring a letter from their doctor stating that it would be okay to get this vaccine.      Best call back number: [phone]

## 2021-01-28 NOTE — TELEPHONE ENCOUNTER
This was already done on 1/25. See letters and prior note in same issue. Letter is available in [employer] or she can be provided hard copy as desired.

## 2021-08-05 ENCOUNTER — TELEPHONE (OUTPATIENT)
Dept: FAMILY MEDICINE CLINIC | Age: 86
End: 2021-08-05

## 2021-08-05 NOTE — TELEPHONE ENCOUNTER
PT's wife called back in stating that they are unable to come in @ 1:20. She said that they would be available after 3 pm today or if its possible to be seen tomorrow by Dr. Altagracia Alexander they are open tomorrow.      Please call back: [phone] (okay to leave a message if they don't answer)

## 2021-08-05 NOTE — TELEPHONE ENCOUNTER
I called PT and left a message letting them know Brenda can see PT at 1:20. I have put PT on the schedule should he able to come in at that time.

## 2021-08-06 ENCOUNTER — OFFICE VISIT (OUTPATIENT)
Dept: FAMILY MEDICINE CLINIC | Age: 86
End: 2021-08-06
Payer: MEDICARE

## 2021-08-06 VITALS
WEIGHT: 130.2 LBS | BODY MASS INDEX: 18.16 KG/M2 | DIASTOLIC BLOOD PRESSURE: 68 MMHG | SYSTOLIC BLOOD PRESSURE: 124 MMHG | RESPIRATION RATE: 17 BRPM | HEART RATE: 71 BPM | TEMPERATURE: 98.3 F | OXYGEN SATURATION: 96 %

## 2021-08-06 DIAGNOSIS — R82.998 LEUKOCYTES IN URINE: ICD-10-CM

## 2021-08-06 DIAGNOSIS — M54.50 ACUTE LEFT-SIDED LOW BACK PAIN WITHOUT SCIATICA: Primary | ICD-10-CM

## 2021-08-06 LAB
BILIRUBIN, POC: NORMAL
BLOOD URINE, POC: NORMAL
CLARITY, POC: NORMAL
COLOR, POC: YELLOW
GLUCOSE URINE, POC: NORMAL
KETONES, POC: NORMAL
LEUKOCYTE EST, POC: NORMAL
NITRITE, POC: NORMAL
PH, POC: 5.5
PROTEIN, POC: NORMAL
SPECIFIC GRAVITY, POC: 1.01
UROBILINOGEN, POC: 0.2

## 2021-08-06 PROCEDURE — 1123F ACP DISCUSS/DSCN MKR DOCD: CPT | Performed by: INTERNAL MEDICINE

## 2021-08-06 PROCEDURE — 4040F PNEUMOC VAC/ADMIN/RCVD: CPT | Performed by: INTERNAL MEDICINE

## 2021-08-06 PROCEDURE — G8427 DOCREV CUR MEDS BY ELIG CLIN: HCPCS | Performed by: INTERNAL MEDICINE

## 2021-08-06 PROCEDURE — 99214 OFFICE O/P EST MOD 30 MIN: CPT | Performed by: INTERNAL MEDICINE

## 2021-08-06 PROCEDURE — 81002 URINALYSIS NONAUTO W/O SCOPE: CPT | Performed by: INTERNAL MEDICINE

## 2021-08-06 PROCEDURE — G8419 CALC BMI OUT NRM PARAM NOF/U: HCPCS | Performed by: INTERNAL MEDICINE

## 2021-08-06 PROCEDURE — 1036F TOBACCO NON-USER: CPT | Performed by: INTERNAL MEDICINE

## 2021-08-06 RX ORDER — ACETAMINOPHEN AND CODEINE PHOSPHATE 300; 15 MG/1; MG/1
1 TABLET ORAL EVERY 4 HOURS PRN
Qty: 18 TABLET | Refills: 0 | Status: SHIPPED | OUTPATIENT
Start: 2021-08-06 | End: 2021-08-09

## 2021-08-06 ASSESSMENT — PATIENT HEALTH QUESTIONNAIRE - PHQ9
SUM OF ALL RESPONSES TO PHQ QUESTIONS 1-9: 0
2. FEELING DOWN, DEPRESSED OR HOPELESS: 0
SUM OF ALL RESPONSES TO PHQ9 QUESTIONS 1 & 2: 0
1. LITTLE INTEREST OR PLEASURE IN DOING THINGS: 0

## 2021-08-06 ASSESSMENT — ENCOUNTER SYMPTOMS: BACK PAIN: 1

## 2021-08-06 NOTE — PROGRESS NOTES
Berna Osorio (:  1935) is a 81 y.o. male,Established patient, here for evaluation of the following chief complaint(s):  Back Pain (ongoing , more trouble past 2 weeks ), Shoulder Pain (ongoing , more trouble past 2 weeks ), and Gait Problem (issues with balance )         ASSESSMENT/PLAN:  1. Acute left-sided low back pain without sciatica  He has some worsening of underlying back pain. Seems to be primarily myofascial in etiology. Recommend application of heat, increasing Tylenol to 3 times per day. Pain is severe, can take Tylenol with codeine, advised of possible sedation adverse effects. Also advised not to take in combination with standard dose Tylenol. Wife and patient voiced agreement understanding. He has been more inactive and I think this is only be getting more pain. He is feeling a bit unsteady on his feet. He does not have any severe weakness on exam but I do think he would benefit from some fall prevention therapy/physical therapy to be proactive about this and help him maintain his strength and balance. Him and his wife are in complete agreement. Referral was given today. Return precautions reviewed  -     [facility]  -     External Referral To Physical Therapy  -     acetaminophen-codeine (TYLENOL #2) 300-15 MG per tablet; Take 1 tablet by mouth every 4 hours as needed for Pain for up to 3 days. , Disp-18 tablet, R-0Normal  -     POCT Urinalysis no Micro  2. Leukocytes in urine  Does not seem to be a urinary issue but will send for culture to ensure  -     Culture, Urine      Return if symptoms worsen or fail to improve. Subjective   SUBJECTIVE/OBJECTIVE:  HPI  Patient presents for evaluation of worsening lower back pain. He states that he always has chronic back pain that he manages with Tylenol twice daily. Pain typically is rated as a 4 out of 10 and over the last 2 weeks, has increased to a 5-6 out of 10. No recent injury. No falls.   He always feels unsteady on his feet but is having to focus more with walking. Wife notes that he has not been moving around quite as much. No new numbness or tingling. No bladder incontinence. No dysuria or hematuria. No confusion. No neurologic deficit otherwise. He tries not to take any medication unless he needs it. He denies any new weakness. He cannot take NSAIDs due to history of low platelets and currently taking Plavix for cad as well    Review of Systems   Musculoskeletal: Positive for back pain and gait problem. Negative for joint swelling and myalgias. Neurological: Negative for dizziness, speech difficulty, weakness, light-headedness, numbness and headaches. Objective    /68   Pulse 71   Temp 98.3 °F (36.8 °C) (Infrared)   Resp 17   Wt 130 lb 3.2 oz (59.1 kg)   SpO2 96%   BMI 18.16 kg/m²     Physical Exam  Constitutional:       General: He is not in acute distress. Appearance: Normal appearance. Musculoskeletal:      Thoracic back: Tenderness present. No bony tenderness. Lumbar back: Tenderness present. No bony tenderness. Decreased range of motion. Negative right straight leg raise test and negative left straight leg raise test.      Comments: Left paraspinal lower thoracic/upper lumbar muscle tenderness   Neurological:      Mental Status: He is alert. Cranial Nerves: Cranial nerves are intact. Sensory: Sensation is intact. Motor: Motor function is intact. No weakness. Coordination: Coordination is intact. Gait: Gait is intact. An electronic signature was used to authenticate this note.     --Martín Small MD

## 2021-08-10 LAB
ORGANISM: ABNORMAL
URINE CULTURE, ROUTINE: ABNORMAL

## 2021-08-10 RX ORDER — NITROFURANTOIN 25; 75 MG/1; MG/1
100 CAPSULE ORAL 2 TIMES DAILY
Qty: 10 CAPSULE | Refills: 0 | Status: SHIPPED | OUTPATIENT
Start: 2021-08-10 | End: 2021-08-15

## 2021-08-11 ENCOUNTER — TELEPHONE (OUTPATIENT)
Dept: FAMILY MEDICINE CLINIC | Age: 86
End: 2021-08-11

## 2021-08-11 NOTE — TELEPHONE ENCOUNTER
I think they misunderstood. I wanted to know if he had any prosthesis/heart valve as this infection in the urine can sometimes signal that a prsotehsis could be infected.  He should take the antibtioic

## 2021-08-11 NOTE — TELEPHONE ENCOUNTER
Pt's wife Celestine Pardo called in stating that she received a call from our office regarding the antibiotic that Dr. Jose Thompson prescribed for him. She says that whomever called wanted to if patient had an prosthesis that could affect PT if he took the antibiotic. She noted that PT has stents and has been waiting to hear back from our office if it's okay for PT to take the nitrofurantoin that they picked up from our office.      Please call back to adv: [phone]

## 2021-08-18 ENCOUNTER — NURSE ONLY (OUTPATIENT)
Dept: FAMILY MEDICINE CLINIC | Age: 86
End: 2021-08-18
Payer: MEDICARE

## 2021-08-18 DIAGNOSIS — Z87.440 RECENT URINARY TRACT INFECTION: Primary | ICD-10-CM

## 2021-08-18 PROCEDURE — 81002 URINALYSIS NONAUTO W/O SCOPE: CPT | Performed by: FAMILY MEDICINE

## 2021-09-08 ENCOUNTER — HOSPITAL ENCOUNTER (OUTPATIENT)
Dept: PHYSICAL THERAPY | Age: 86
Setting detail: THERAPIES SERIES
Discharge: HOME | End: 2021-09-08
Payer: MEDICARE

## 2021-09-08 PROCEDURE — 97110 THERAPEUTIC EXERCISES: CPT

## 2021-09-08 PROCEDURE — 97161 PT EVAL LOW COMPLEX 20 MIN: CPT

## 2021-09-08 PROCEDURE — 97530 THERAPEUTIC ACTIVITIES: CPT

## 2021-09-08 NOTE — PLAN OF CARE
Caleb Arauz [address]      Physical Therapy Certification    Dear Referring Practitioner: Dr. Vito Cline,    We had the pleasure of evaluating the following patient for physical therapy services at [address] [place] [address]. A summary of our findings can be found in the initial assessment below. This includes our plan of care. If you have any questions or concerns regarding these findings, please do not hesitate to contact me at the office phone number checked above. Thank you for the referral.       Physician Signature:_______________________________Date:__________________  By signing above (or electronic signature), therapists plan is approved by physician      Patient: Josemanuel Moore   : 1935   MRN: [id number]  Referring Physician: Referring Practitioner: Dr. Vito Cline      Evaluation Date: 2021      Medical Diagnosis Information:  Diagnosis: M54.5 (ICD-10-CM) - Acute left-sided low back pain without sciatica   Treatment Diagnosis: M54.5 Lumbar pain; R26.2 Difficulty walking; R53.1 Weakness                                         Insurance information: PT Insurance Information: Medicare     Precautions/ Contra-indications: quintuple bypass surgery; takes statin, betablocker, and ASA;     C-SSRS Triggered by Intake questionnaire (Past 2 wk assessment):   [x] No, Questionnaire did not trigger screening.   [] Yes, Patient intake triggered further evaluation      [] C-SSRS Screening completed  [] PCP notified via Plan of Care  [] Emergency services notified     Latex Allergy:  [x]NO      []YES  Preferred Language for Healthcare:   [x]English       []other:    SUBJECTIVE: Patient stated complaint: 81year old male presents to PT with low back pain ongoing for 6 months to a year without any GREGORIO. Pain is across low back but slightly worse on left side. No radiating pain, described as a \"tooth ache\". Denies any numbness/tingling.  Standing seems to aggravate his pain. Lying down improves symptoms - either supine or side-lying. Denies any significant weight loss in immediate past but does note he has gone from 170 down to 130 in the past \"few years\". No history of cancer, steroid  Use, trauma, night pain, or bowel/bladder changes. Does feel like he has been a bit more unsteady recently up on his feet but has not had any falls. Imaging: None for this episode  Current Medications: Tylenol BID        Relevant Medical History: no previous lumbar injury or surgery;    Functional Disability Index/G-Codes:   OUTCOME MEASURE DATE DEFICIT   CONSUELO 9/8/21 IE 22% Deficit              Pain Scale: 2/10 at rest, 8/10 at worst  Easing factors: lying down  Provocative factors: standing     Type: [x]Constant           []Intermittent      []Radiating         [x]Localized         []other:                Numbness/Tingling: Denies     Occupation/School:  Retired     Living Status/Prior Level of Function: Independent with ADLs and IADLs; hopes to get back to standing without pain and walking to [employer]     OBJECTIVE:    9/8/21  ROM   Comments   Trunk flexion Mid-murphy Knees flex, patient begins falling forward outside his PARAG   Trunk extension 50% limited Knees flex, patient begins falling outside his PARAG; increased tension in extensors noted   Trunk R sidebend Mid thigh Mild pain on right QL   Trunk L sidebend Mid thigh Mild pain on left QL   Trunk R rotation     Trunk L rotation     HS flexibility 45 deg SLR bilaterally Just HS tightness, no radicular pain                      9/8/21  Strength Left Right Comments   Hip flexion(L2) 4+ 4+    Knee extension(L3) 5 5    Knee flexion(S1-2) 5 5    Ankle dorsiflexion(L4) 5 5    Toe extension(L5) 5 5    Ankle eversion/plantar flexion(S1) 5 5    Hip abd             9/8/21  Special tests   Comments   SLR Neg - just HS tightness     Slump test NT     Pelvic symmetry  Normal     Segmental Spinal mobility NT     Heel walk NT     Toe walk NT 9/8/21  Dermatomes Normal Abnormal Comments   Inguinal area (L1)  x     Anterior mid-thigh (L2) x     Distal ant thigh/med knee (L3) x     Medial lower leg and foot (L4) x     Lateral lower leg and foot (L5) x     Posterior calf (S1) x     Medial calcaneus (S2) x                         9/8/21  Reflexes Normal Abnormal Comments   S1-2 Seated achilles x     S1-2 Prone knee bend      L3-4 Patellar tendon x     C5-6 Biceps      C6 Brachioradialis      C7-8 Triceps      Clonus x  Single beat on both sides   Babinski      Jacinto's x         Joint mobility: NT               []Normal               []Hypo              []Hyper     Palpation: no TTP in standing mid-line spine or muscles; significant increase in tension lumbar paraspinals bilaterally     Functional Mobility/Transfers: ind     Posture: increased lordosis     Bandages/Dressings/Incisions: n/a     Gait: (include devices/WB status) slow speed, decreased foot clearance; small step length; narrow PARAG;                           [x] Patient history, allergies, meds reviewed. Medical chart reviewed. See intake form. Review Of Systems (ROS):  [x]Performed Review of systems (Integumentary, CardioPulmonary, Neurological) by intake and observation. Intake form has been scanned into medical record. Patient has been instructed to contact their primary care physician regarding ROS issues if not already being addressed at this time.        Co-morbidities/Complexities (which will affect course of rehabilitation):   []None              Arthritic conditions   []Rheumatoid arthritis (M05.9)  []Osteoarthritis (M19.91)    Cardiovascular conditions   []Hypertension (I10)  []Hyperlipidemia (E78.5)  []Angina pectoris (I20)  []Atherosclerosis (I70)    Musculoskeletal conditions   []Disc pathology   []Congenital spine pathologies   []Prior surgical intervention  []Osteoporosis (M81.8)  []Osteopenia (M85.8)   Endocrine conditions   []Hypothyroid [x]Reduced ability to maintain good posture and demonstrate good body mechanics with sitting, bending, and lifting              []Reduced ability to sleep              [] Reduced ability or tolerance with driving and/or computer work              [x]Reduced ability to perform lifting, reaching, carrying tasks              [x]Reduced ability to squat              [x]Reduced ability to forward bend              [x]Reduced ability to ambulate prolonged functional periods/distances/surfaces              []Reduced ability to ascend/descend stairs              []other:       Participation Restrictions              [x]Reduced participation in self care activities              [x]Reduced participation in home management activities              []Reduced participation in work activities              [x]Reduced participation in social activities. []Reduced participation in sport/recreational activities. Classification:              []Signs/symptoms consistent with Lumbar instability/stabilization subgroup. []Signs/symptoms consistent with Lumbar mobilization/manipulation subgroup, myotomes and dermatomes intact. Meets manipulation criteria. []Signs/symptoms consistent with Lumbar direction specific/centralization subgroup              []Signs/symptoms consistent with Lumbar traction subgroup                            [x]Signs/symptoms consistent with lumbar facet dysfunction              [x]Signs/symptoms consistent with lumbar stenosis type dysfunction              []Signs/symptoms consistent with nerve root involvement including myotome & dermatome dysfunction              []Signs/symptoms consistent with post-surgical status including: decreased ROM, strength and function.               []signs/symptoms consistent with pathology which may benefit from Dry needling                []other:       Prognosis/Rehab Potential:                                       []Excellent [x]Good                 []Fair              []Poor     Tolerance of evaluation/treatment:               []Excellent              [x]Good                 []Fair              []Poor     Physical Therapy Evaluation Complexity Justification  [x] A history of present problem with:  [] no personal factors and/or comorbidities that impact the plan of care;  [x]1-2 personal factors and/or comorbidities that impact the plan of care  []3 personal factors and/or comorbidities that impact the plan of care  [x] An examination of body systems using standardized tests and measures addressing any of the following: body structures and functions (impairments), activity limitations, and/or participation restrictions;:  [] a total of 1-2 or more elements   [] a total of 3 or more elements   [x] a total of 4 or more elements   [x] A clinical presentation with:  [x] stable and/or uncomplicated characteristics   [] evolving clinical presentation with changing characteristics  [] unstable and unpredictable characteristics;   [x] Clinical decision making of [x] low, [] moderate, [] high complexity using standardized patient assessment instrument and/or measurable assessment of functional outcome. [x] EVAL (LOW) 51197 (typically 20 minutes face-to-face)  [] EVAL (MOD) 75636 (typically 30 minutes face-to-face)  [] EVAL (HIGH) 31612 (typically 45 minutes face-to-face)  [] RE-EVAL            PLAN:      Frequency/Duration:  2 days per week for 6 Weeks:  Interventions:  [x]  Therapeutic exercise including: strength training, ROM, for LE, Glutes and core   [x]  NMR activation and proprioception for glutes , LE and Core   [x]  Manual therapy as indicated for Hip complex, LE and spine to include: Dry Needling/IASTM, STM, PROM, Gr I-IV mobilizations, manipulation.              [x]  Modalities as needed that may include: thermal agents, E-stim, Biofeedback, US, iontophoresis as indicated  [x]  Patient education on joint protection, postural re-education, activity modification, progression of HEP. HEP instruction:  Patient instructed on HEP on this date with handout provided and all questions answered. Discussions about how to progress sets/reps/resistance as necessary for fatigue and challenge. Patient was instructed to contact PT with any questions or concerns about HEP moving forward. Patient verbally stated she/he understood. Access Code: [facility]  URL: [email or web address]  Date: 09/08/2021  Prepared by: Obie Yung    Exercises  Hooklying Hamstring Stretch with Strap - 2 x daily - 7 x weekly - 3 sets - 30 hold  Hooklying Single Knee to Chest Stretch - 2 x daily - 7 x weekly - 10 reps - 10 hold  Hooklying Lumbar Rotation - 2 x daily - 7 x weekly - 10 reps - 10 hold  Supine Posterior Pelvic Tilt - 2 x daily - 7 x weekly - 2 sets - 10 reps       GOALS:   Patient stated goal: Return to walking to [facility]    [] Progressing: [] Met: [] Not Met: [] Adjusted    Therapist goals for Patient:   Short Term Goals: To be achieved in: 2 weeks  1. Independent in HEP and progression per patient tolerance, in order to prevent re-injury. [] Progressing: [] Met: [] Not Met: [] Adjusted   2. Patient will have a decrease in pain to facilitate improvement in movement, function, and ADLs as indicated by Functional Deficits. [] Progressing: [] Met: [] Not Met: [] Adjusted    Long Term Goals: To be achieved in: 6 weeks  1. Disability index score of 11% or less for the CONSUELO to assist with reaching prior level of function. [] Progressing: [] Met: [] Not Met: [] Adjusted  2. Patient will demonstrate increased AROM to WNL, good LS mobility, good hip ROM to allow for proper joint functioning as indicated by patients Functional Deficits. [] Progressing: [] Met: [] Not Met: [] Adjusted  3.  Patient will demonstrate an increase in Strength to good proximal hip and core activation to allow for proper functional mobility as indicated by patients Functional Deficits. [] Progressing: [] Met: [] Not Met: [] Adjusted  4. Patient will return to ADL and other functional activities without increased symptoms or restriction. [] Progressing: [] Met: [] Not Met: [] Adjusted  5.  Patient will tolerate standing > 15 minutes without pain in his back in order to return to standing at [facility] without pain (patient specific functional goal)    [] Progressing: [] Met: [] Not Met: [] Adjusted       Electronically signed by: Penelope Mays PT, DPT, OCS

## 2021-09-08 NOTE — FLOWSHEET NOTE
Ankle dorsiflexion(L4) 5 5     Toe extension(L5) 5 5     Ankle eversion/plantar flexion(S1) 5 5     Hip abd             9/8/21  Special tests   Comments   SLR Neg - just HS tightness     Slump test NT     Pelvic symmetry  Normal     Segmental Spinal mobility NT     Heel walk NT     Toe walk NT                        9/8/21  Dermatomes Normal Abnormal Comments   Inguinal area (L1)  x       Anterior mid-thigh (L2) x       Distal ant thigh/med knee (L3) x       Medial lower leg and foot (L4) x       Lateral lower leg and foot (L5) x       Posterior calf (S1) x       Medial calcaneus (S2) x                                        9/8/21  Reflexes Normal Abnormal Comments   S1-2 Seated achilles x       S1-2 Prone knee bend         L3-4 Patellar tendon x       C5-6 Biceps         C6 Brachioradialis         C7-8 Triceps         Clonus x   Single beat on both sides   Babinski         Jacinto's x             Joint mobility: NT               []? Normal               []? Hypo              []? Hyper     Palpation: no TTP in standing mid-line spine or muscles; significant increase in tension lumbar paraspinals bilaterally     Functional Mobility/Transfers: ind     Posture: increased lordosis     Bandages/Dressings/Incisions: n/a     Gait: (include devices/WB status) slow speed, decreased foot clearance; small step length; narrow PARAG;      RESTRICTIONS/PRECAUTIONS: quintuple bypass surgery; takes statin, betablocker, and ASA;    Exercises/Interventions:   ROM/stretches     SKTC 10x10\"    DKTC NPV    Prayer stretch     Supine HS 3x30\" each With strap   Posterior Pelvic tilt 2x10    Hook lying rotation 10x10\"    Cat and camel          Strengthening     SLR     Quadruped alternate UE reaches     Quadruped alternate LE reaches     Quadruped alternate UE/LE reaches     MemfoACT heel raises     Sit ups      planks     Tband lat pulls     Tband rows         Manual Intervention             Prone PA      GISTM/STM      Lumbar Manip      SI Manip      Hip belt mobs      Traction - NPV              Therapeutic Exercise and NMR EXR  [x] (14771) Provided verbal/tactile cueing for activities related to strengthening, flexibility, endurance, ROM  for improvements in proximal hip and core control with self care, mobility, lifting and ambulation. [x] (88007) Provided verbal/tactile cueing for activities related to improving balance, coordination, kinesthetic sense, posture, motor skill, proprioception  to assist with core control in self care, mobility, lifting, and ambulation. Therapeutic Activities:    [x] (49092 or 15707) Provided verbal/tactile cueing for activities related to improving balance, coordination, kinesthetic sense, posture, motor skill, proprioception and motor activation to allow for proper function  with self care and ADLs  [x] (07725) Provided training and instruction to the patient for proper core and proximal hip recruitment and positioning with ambulation re-education     Home Exercise Program:    [x] (45307) Reviewed/Progressed HEP activities related to strengthening, flexibility, endurance, ROM of core, proximal hip and LE for functional self-care, mobility, lifting and ambulation   [x] (95635) Reviewed/Progressed HEP activities related to improving balance, coordination, kinesthetic sense, posture, motor skill, proprioception of core, proximal hip and LE for self care, mobility, lifting, and ambulation      Manual Treatments:  PROM / STM / Oscillations-Mobs:  G-I, II, III, IV (PA's, Inf., Post.)  [x] (97928) Provided manual therapy to mobilize proximal hip and LS spine soft tissue/joints for the purpose of modulating pain, promoting relaxation,  increasing ROM, reducing/eliminating soft tissue swelling/inflammation/restriction, improving soft tissue extensibility and allowing for proper ROM for normal function with self care, mobility, lifting and ambulation.      Modalities:   Discussed heat for home    Charges:  Timed Code Treatment Minutes: 25   Total Treatment Minutes: 45       [x] EVAL (LOW) 33324 (typically 20 minutes face-to-face)  [] EVAL (MOD) 13264 (typically 30 minutes face-to-face)  [] EVAL (HIGH) 59441 (typically 45 minutes face-to-face)  [] RE-EVAL     [x] JI(18952) x 1    [] IONTO  [] NMR (64644) x     [] VASO  [] Manual (42709) x      [] Other:  [x] TA x 1     [] Mech Traction (01268)  [] ES(attended) (37946)      [] ES (un) (98288):     GOALS:   Patient stated goal: Return to walking to [facility]    [] Progressing: [] Met: [] Not Met: [] Adjusted    Therapist goals for Patient:   Short Term Goals: To be achieved in: 2 weeks  1. Independent in HEP and progression per patient tolerance, in order to prevent re-injury. [] Progressing: [] Met: [] Not Met: [] Adjusted   2. Patient will have a decrease in pain to facilitate improvement in movement, function, and ADLs as indicated by Functional Deficits. [] Progressing: [] Met: [] Not Met: [] Adjusted    Long Term Goals: To be achieved in: 6 weeks  1. Disability index score of 11% or less for the CONSUELO to assist with reaching prior level of function. [] Progressing: [] Met: [] Not Met: [] Adjusted  2. Patient will demonstrate increased AROM to WNL, good LS mobility, good hip ROM to allow for proper joint functioning as indicated by patients Functional Deficits. [] Progressing: [] Met: [] Not Met: [] Adjusted  3. Patient will demonstrate an increase in Strength to good proximal hip and core activation to allow for proper functional mobility as indicated by patients Functional Deficits. [] Progressing: [] Met: [] Not Met: [] Adjusted  4. Patient will return to ADL and other functional activities without increased symptoms or restriction. [] Progressing: [] Met: [] Not Met: [] Adjusted  5.  Patient will tolerate standing > 15 minutes without pain in his back in order to return to standing at [employer] without pain (patient specific functional goal)    [] Progressing: [] Met: [] Not Met: [] Adjusted     Overall Progression Towards Functional goals/ Treatment Progress Update:  [] Patient is progressing as expected towards functional goals listed. [] Progression is slowed due to complexities/Impairments listed. [] Progression has been slowed due to co-morbidities. [x] Plan just implemented, too soon to assess goals progression <30days   [] Goals require adjustment due to lack of progress  [] Patient is not progressing as expected and requires additional follow up with physician  [] Other    Prognosis for POC: [x] Good [] Fair  [] Poor      Patient requires continued skilled intervention: [x] Yes  [] No    Treatment/Activity Tolerance:  [x] Patient able to complete treatment  [] Patient limited by fatigue  [] Patient limited by pain     [] Patient limited by other medical complications  [] Other:     Patient education:  9/8/21: Patient educated about PT diagnosis, prognosis, and plan of care; educated on role of physical therapy; educated on HEP; educated on anatomy of lumbar spine; discussed pelvic and lumbar positioning for comfort; encouraged use of heat for muscle relaxation and symptom managemen. Prognosis: [x] Good [] Fair  [] Poor    Patient Requires Follow-up: [x] Yes  [] No    PLAN: See eval  [] Continue per plan of care [] Alter current plan (see comments)  [x] Plan of care initiated [] Hold pending MD visit [] Discharge    Electronically signed by: Penelope Mays PT, DPT, OCS    *If patient does not return for further follow ups after this date. Please consider this as the patients discharge from physical therapy.

## 2021-09-14 ENCOUNTER — HOSPITAL ENCOUNTER (OUTPATIENT)
Dept: PHYSICAL THERAPY | Age: 86
Setting detail: THERAPIES SERIES
Discharge: HOME | End: 2021-09-14
Payer: MEDICARE

## 2021-09-14 NOTE — FLOWSHEET NOTE
[facility]    Physical Therapy  Cancellation/No-show Note  Patient Name:  Sravanthi Silvestre  :  1935   Date:  2021  Cancelled visits to date: 1  No-shows to date: 0    For today's appointment patient:  [x]  Cancelled  []  Rescheduled appointment  []  No-show     Reason given by patient:  [x]  Patient ill  []  Conflicting appointment   []  No transportation    []  Conflict with work  []  No reason given  []  Other:     Comments:      Electronically signed by:  Estefani Barajas, PT, DPT, OCS

## 2021-09-16 ENCOUNTER — HOSPITAL ENCOUNTER (OUTPATIENT)
Dept: PHYSICAL THERAPY | Age: 86
Setting detail: THERAPIES SERIES
Discharge: HOME | End: 2021-09-16
Payer: MEDICARE

## 2021-09-16 PROCEDURE — 97110 THERAPEUTIC EXERCISES: CPT

## 2021-09-16 PROCEDURE — 97140 MANUAL THERAPY 1/> REGIONS: CPT

## 2021-09-16 NOTE — FLOWSHEET NOTE
[address] [facility]    Physical Therapy Daily Treatment Note  Date:  2021    Patient Name:  Carlie Folres   \"BILL\"  :  1935  MRN: [id number]  Restrictions/Precautions:    Medical/Treatment Diagnosis Information:  · Diagnosis: M54.5 (ICD-10-CM) - Acute left-sided low back pain without sciatica  · Treatment Diagnosis: M54.5 Lumbar pain; R26.2 Difficulty walking; R53.1 Weakness  Insurance/Certification information:  PT Insurance Information: Medicare  Physician Information:  Referring Practitioner: Dr. Danielle Granados  Has the plan of care been signed (Y/N):        [x]  Yes  []  No     Date of Patient follow up with Physician: not scheduled      Is this a Progress Report:     []  Yes  [x]  No        If Yes:  Date Range for reporting period:  Beginnin21  Ending    Progress report will be due (10 Rx or 30 days whichever is less):        Recertification will be due (POC Duration  / 90 days whichever is less): 10/20/21         Visit # Insurance Allowable Auth Required   2 Medicare []  Yes [x]  No      OUTCOME MEASURE DATE DEFICIT   CONSUELO 21 22% Deficit        Patient given satisfaction survey? [] Yes   [x] No      Latex Allergy:  [x]NO      []YES  Preferred Language for Healthcare:   [x]English       []other:      Pain level:  2-810     SUBJECTIVE:  Back feeling better. Has done his HEP \"a few times\" since last week. No new concerns or issues. Still has not talked to PCP about referral for shoulder/upper back.     OBJECTIVE:       21  ROM   Comments   Trunk flexion Mid-murphy Knees flex, patient begins falling forward outside his PARAG   Trunk extension 50% limited Knees flex, patient begins falling outside his PARAG; increased tension in extensors noted   Trunk R sidebend Mid thigh Mild pain on right QL   Trunk L sidebend Mid thigh Mild pain on left QL   Trunk R rotation       Trunk L rotation       HS flexibility 45 deg SLR bilaterally Just HS tightness, no radicular pain                      9/8/21  Strength Left Right Comments   Hip flexion(L2) 4+ 4+     Knee extension(L3) 5 5     Knee flexion(S1-2) 5 5     Ankle dorsiflexion(L4) 5 5     Toe extension(L5) 5 5     Ankle eversion/plantar flexion(S1) 5 5     Hip abd             9/8/21  Special tests   Comments   SLR Neg - just HS tightness     Slump test NT     Pelvic symmetry  Normal     Segmental Spinal mobility NT     Heel walk NT     Toe walk NT                        9/8/21  Dermatomes Normal Abnormal Comments   Inguinal area (L1)  x       Anterior mid-thigh (L2) x       Distal ant thigh/med knee (L3) x       Medial lower leg and foot (L4) x       Lateral lower leg and foot (L5) x       Posterior calf (S1) x       Medial calcaneus (S2) x                                        9/8/21  Reflexes Normal Abnormal Comments   S1-2 Seated achilles x       S1-2 Prone knee bend         L3-4 Patellar tendon x       C5-6 Biceps         C6 Brachioradialis         C7-8 Triceps         Clonus x   Single beat on both sides   Babinski         Jacinto's x             Joint mobility: NT               []? Normal               []? Hypo              []? Hyper     Palpation: no TTP in standing mid-line spine or muscles; significant increase in tension lumbar paraspinals bilaterally     Functional Mobility/Transfers: ind     Posture: increased lordosis     Bandages/Dressings/Incisions: n/a     Gait: (include devices/WB status) slow speed, decreased foot clearance; small step length; narrow PARAG;      RESTRICTIONS/PRECAUTIONS: quintuple bypass surgery; takes statin, betablocker, and ASA;    Exercises/Interventions:   ROM/stretches     SKTC 3x30\" each    DKTC 3x30\"    Prayer stretch     Supine HS 3x30\" each With strap   Posterior Pelvic tilt 2x10    Hook lying rotation 10x10\"    Cat and camel          Strengthening     TA Isometric 10x5\"              SLR     Quadruped alternate UE reaches     Quadruped alternate LE reaches Quadruped alternate UE/LE reaches     Testt heel raises     Sit ups      planks     Tband lat pulls     Tband rows         Manual Intervention             Prone PA      GISTM/STM      Lumbar Manip      SI Manip      Hip belt mobs      Traction  8'             Therapeutic Exercise and NMR EXR  [x] (58556) Provided verbal/tactile cueing for activities related to strengthening, flexibility, endurance, ROM  for improvements in proximal hip and core control with self care, mobility, lifting and ambulation. [x] (22728) Provided verbal/tactile cueing for activities related to improving balance, coordination, kinesthetic sense, posture, motor skill, proprioception  to assist with core control in self care, mobility, lifting, and ambulation. Therapeutic Activities:    [x] (30223 or 31373) Provided verbal/tactile cueing for activities related to improving balance, coordination, kinesthetic sense, posture, motor skill, proprioception and motor activation to allow for proper function  with self care and ADLs  [x] (17521) Provided training and instruction to the patient for proper core and proximal hip recruitment and positioning with ambulation re-education     Home Exercise Program:    [x] (30655) Reviewed/Progressed HEP activities related to strengthening, flexibility, endurance, ROM of core, proximal hip and LE for functional self-care, mobility, lifting and ambulation   [x] (62048) Reviewed/Progressed HEP activities related to improving balance, coordination, kinesthetic sense, posture, motor skill, proprioception of core, proximal hip and LE for self care, mobility, lifting, and ambulation      Patient instructed on HEP on this date with handout provided and all questions answered. Discussions about how to progress sets/reps/resistance as necessary for fatigue and challenge. Patient was instructed to contact PT with any questions or concerns about HEP moving forward.  Patient verbally stated she/he understood. Access Code: [facility]  URL: [email or web address]  Date: 09/16/2021  Prepared by: Obie Yung    Exercises  Hooklying Hamstring Stretch with Strap - 2 x daily - 7 x weekly - 3 sets - 30 hold  Hooklying Single Knee to Chest Stretch - 2 x daily - 7 x weekly - 3 reps - 30 hold  Supine Double Knee to Chest - 2 x daily - 7 x weekly - 3 reps - 30 hold  Hooklying Lumbar Rotation - 2 x daily - 7 x weekly - 10 reps - 10 hold  Supine Posterior Pelvic Tilt - 2 x daily - 7 x weekly - 2 sets - 10 reps  Supine Transversus Abdominis Bracing - Hands on Stomach - 2 x daily - 7 x weekly - 10 reps - 5\" hold      Manual Treatments:  PROM / STM / Oscillations-Mobs:  G-I, II, III, IV (PA's, Inf., Post.)  [x] (44825) Provided manual therapy to mobilize proximal hip and LS spine soft tissue/joints for the purpose of modulating pain, promoting relaxation,  increasing ROM, reducing/eliminating soft tissue swelling/inflammation/restriction, improving soft tissue extensibility and allowing for proper ROM for normal function with self care, mobility, lifting and ambulation. Modalities:   Discussed heat for home    Charges:  Timed Code Treatment Minutes: 30   Total Treatment Minutes: 30       [] EVAL (LOW) 71765 (typically 20 minutes face-to-face)  [] EVAL (MOD) 15012 (typically 30 minutes face-to-face)  [] EVAL (HIGH) 97825 (typically 45 minutes face-to-face)  [] RE-EVAL     [x] OY(74665) x 1    [] IONTO  [] NMR (57832) x     [] VASO  [x] Manual (65401) x  1    [] Other:  [] TA x 1     [] Mech Traction (63404)  [] ES(attended) (09561)      [] ES (un) (44547):     GOALS:   Patient stated goal: Return to walking to [employer]    [] Progressing: [] Met: [] Not Met: [] Adjusted    Therapist goals for Patient:   Short Term Goals: To be achieved in: 2 weeks  1. Independent in HEP and progression per patient tolerance, in order to prevent re-injury. [] Progressing: [] Met: [] Not Met: [] Adjusted   2.  Patient will have a decrease in pain to facilitate improvement in movement, function, and ADLs as indicated by Functional Deficits. [] Progressing: [] Met: [] Not Met: [] Adjusted    Long Term Goals: To be achieved in: 6 weeks  1. Disability index score of 11% or less for the CONSUELO to assist with reaching prior level of function. [] Progressing: [] Met: [] Not Met: [] Adjusted  2. Patient will demonstrate increased AROM to WNL, good LS mobility, good hip ROM to allow for proper joint functioning as indicated by patients Functional Deficits. [] Progressing: [] Met: [] Not Met: [] Adjusted  3. Patient will demonstrate an increase in Strength to good proximal hip and core activation to allow for proper functional mobility as indicated by patients Functional Deficits. [] Progressing: [] Met: [] Not Met: [] Adjusted  4. Patient will return to ADL and other functional activities without increased symptoms or restriction. [] Progressing: [] Met: [] Not Met: [] Adjusted  5. Patient will tolerate standing > 15 minutes without pain in his back in order to return to standing at [employer] without pain (patient specific functional goal)    [] Progressing: [] Met: [] Not Met: [] Adjusted     Overall Progression Towards Functional goals/ Treatment Progress Update:  [] Patient is progressing as expected towards functional goals listed. [] Progression is slowed due to complexities/Impairments listed. [] Progression has been slowed due to co-morbidities.   [x] Plan just implemented, too soon to assess goals progression <30days   [] Goals require adjustment due to lack of progress  [] Patient is not progressing as expected and requires additional follow up with physician  [] Other    Prognosis for POC: [x] Good [] Fair  [] Poor      Patient requires continued skilled intervention: [x] Yes  [] No    Treatment/Activity Tolerance:  [x] Patient able to complete treatment  [] Patient limited by fatigue  [] Patient limited by pain     [] Patient limited by other medical complications  [] Other: Tolerated therapy well today. No pain with current program. Showed good understanding of initial exercises showing compliance to date. Very good pelvic control with PPT exercise. Also was quick to learn a TA isometric without holding breath. HEP updated . Encouraged daily stretching for symptoms / tightness in back. Continue PT to improve lumbar mobility, flexibility and LE strength in order to reduce pain and return patient to PLOF. Patient education:  9/8/21: Patient educated about PT diagnosis, prognosis, and plan of care; educated on role of physical therapy; educated on HEP; educated on anatomy of lumbar spine; discussed pelvic and lumbar positioning for comfort; encouraged use of heat for muscle relaxation and symptom managemen. Prognosis: [x] Good [] Fair  [] Poor    Patient Requires Follow-up: [x] Yes  [] No    PLAN: See eval  [] Continue per plan of care [] Alter current plan (see comments)  [x] Plan of care initiated [] Hold pending MD visit [] Discharge    Electronically signed by: Kellie Hodgkin, PT, DPT, OCS    *If patient does not return for further follow ups after this date. Please consider this as the patients discharge from physical therapy.

## 2021-09-23 ENCOUNTER — HOSPITAL ENCOUNTER (OUTPATIENT)
Dept: PHYSICAL THERAPY | Age: 86
Setting detail: THERAPIES SERIES
Discharge: HOME | End: 2021-09-23
Payer: MEDICARE

## 2021-09-23 PROCEDURE — 97110 THERAPEUTIC EXERCISES: CPT

## 2021-09-23 PROCEDURE — 97112 NEUROMUSCULAR REEDUCATION: CPT

## 2021-09-23 NOTE — DISCHARGE SUMMARY
[facility]      Physical Therapy Discharge Note  Date:  2021    Patient Name:  Singh Membreno   \"BILL\"  :  1935  MRN: [id number]  Restrictions/Precautions:    Medical/Treatment Diagnosis Information:  · Diagnosis: M54.5 (ICD-10-CM) - Acute left-sided low back pain without sciatica  · Treatment Diagnosis: M54.5 Lumbar pain; R26.2 Difficulty walking; R53.1 Weakness  Insurance/Certification information:  PT Insurance Information: Medicare  Physician Information:  Referring Practitioner: Dr. Doroteo Woodruff  Has the plan of care been signed (Y/N):        [x]  Yes  []  No     Date of Patient follow up with Physician: not scheduled      Is this a Progress Report:     []  Yes  [x]  No        If Yes:  Date Range for reporting period:  Beginnin21  Ending    Progress report will be due (10 Rx or 30 days whichever is less):        Recertification will be due (POC Duration  / 90 days whichever is less): 10/20/21         Visit # Insurance Allowable Auth Required   3 Medicare []  Yes [x]  No      OUTCOME MEASURE DATE DEFICIT   CONSUELO 21 22% Deficit   CONSUELO 21 8% Deficit   Patient given satisfaction survey? [x] Yes   [] No      Latex Allergy:  [x]NO      []YES  Preferred Language for Healthcare:   [x]English       []other:      Pain level:  0/10 at rest; 0/10 worst pain in past 24 hours 21    SUBJECTIVE:  Doing well today. Back feels 100%. Just gets a bit of stiffness in AM but does his stretches and walks to bathroom and feels fine. No pain or symptoms during the day. Feels ready to discharge to THE [facility].     OBJECTIVE:       21   ROM   Comments   Trunk flexion To ankles, no pain Knees flex, patient begins falling forward outside his PARAG   Trunk extension 25% limited, no pain Knees flex, patient begins falling outside his PARAG; increased tension in extensors noted   Trunk R sidebend Knee joint line No pain   Trunk L sidebend Knee joint line No pain   Trunk R rotation       Trunk L rotation       HS flexibility 65 deg SLR bilaterally Just HS tightness, no radicular pain                      9/8/21  Strength Left Right Comments   Hip flexion(L2) 4+ 4+     Knee extension(L3) 5 5     Knee flexion(S1-2) 5 5     Ankle dorsiflexion(L4) 5 5     Toe extension(L5) 5 5     Ankle eversion/plantar flexion(S1) 5 5     Hip abd             9/8/21  Special tests   Comments   SLR Neg - just HS tightness     Slump test NT     Pelvic symmetry  Normal     Segmental Spinal mobility NT     Heel walk NT     Toe walk NT                        9/8/21  Dermatomes Normal Abnormal Comments   Inguinal area (L1)  x       Anterior mid-thigh (L2) x       Distal ant thigh/med knee (L3) x       Medial lower leg and foot (L4) x       Lateral lower leg and foot (L5) x       Posterior calf (S1) x       Medial calcaneus (S2) x                                        9/8/21  Reflexes Normal Abnormal Comments   S1-2 Seated achilles x       S1-2 Prone knee bend         L3-4 Patellar tendon x       C5-6 Biceps         C6 Brachioradialis         C7-8 Triceps         Clonus x   Single beat on both sides   Babinski         Jacinto's x             Joint mobility: NT               []? Normal               []? Hypo              []? Hyper     Palpation: no TTP in standing mid-line spine or muscles; significant increase in tension lumbar paraspinals bilaterally     Functional Mobility/Transfers: ind     Posture: increased lordosis     Bandages/Dressings/Incisions: n/a     Gait: (include devices/WB status) slow speed, decreased foot clearance; small step length; narrow PARAG;      RESTRICTIONS/PRECAUTIONS: quintuple bypass surgery; takes statin, betablocker, and ASA;    Exercises/Interventions:   ROM/stretches     SKTC 3x30\" each    DKTC 3x30\"    Prayer stretch     Supine HS 3x30\" each With strap   Posterior Pelvic tilt 2x10    Hook lying rotation 10x10\"    Cat and camel          Strengthening     TA Isometric 10x5\"    TA Alt LE March 2x10    Supine clamshell 2x10 Red    SLR     Quadruped alternate UE reaches     Quadruped alternate LE reaches     Quadruped alternate UE/LE reaches     [employer] heel raises     Sit ups      planks     Tband lat pulls     Tband rows         Manual Intervention             Prone PA      GISTM/STM      Lumbar Manip      SI Manip      Hip belt mobs                  Therapeutic Exercise and NMR EXR  [x] (42766) Provided verbal/tactile cueing for activities related to strengthening, flexibility, endurance, ROM  for improvements in proximal hip and core control with self care, mobility, lifting and ambulation. [x] (34233) Provided verbal/tactile cueing for activities related to improving balance, coordination, kinesthetic sense, posture, motor skill, proprioception  to assist with core control in self care, mobility, lifting, and ambulation. Therapeutic Activities:    [x] (93870 or 95599) Provided verbal/tactile cueing for activities related to improving balance, coordination, kinesthetic sense, posture, motor skill, proprioception and motor activation to allow for proper function  with self care and ADLs  [x] (19470) Provided training and instruction to the patient for proper core and proximal hip recruitment and positioning with ambulation re-education     Home Exercise Program:    [x] (73337) Reviewed/Progressed HEP activities related to strengthening, flexibility, endurance, ROM of core, proximal hip and LE for functional self-care, mobility, lifting and ambulation   [x] (90382) Reviewed/Progressed HEP activities related to improving balance, coordination, kinesthetic sense, posture, motor skill, proprioception of core, proximal hip and LE for self care, mobility, lifting, and ambulation      Patient instructed on HEP on this date with handout provided and all questions answered. Discussions about how to progress sets/reps/resistance as necessary for fatigue and challenge. Patient was instructed to contact PT with any questions or concerns about HEP moving forward. Patient verbally stated she/he understood. Access Code: [facility]  URL: [email or web address]  Date: 09/23/2021  Prepared by: Obie Yung    Exercises  Hooklying Hamstring Stretch with Strap - 2 x daily - 7 x weekly - 3 sets - 30 hold  Hooklying Single Knee to Chest Stretch - 2 x daily - 7 x weekly - 3 reps - 30 hold  Supine Double Knee to Chest - 2 x daily - 7 x weekly - 3 reps - 30 hold  Hooklying Lumbar Rotation - 2 x daily - 7 x weekly - 10 reps - 10 hold  Supine Posterior Pelvic Tilt - 2 x daily - 7 x weekly - 2 sets - 10 reps  Supine Transversus Abdominis Bracing - Hands on Stomach - 2 x daily - 7 x weekly - 10 reps - 5\" hold  Supine March with Alternating Leg Lifts - 1 x daily - 7 x weekly - 3 sets - 10 reps  Hooklying Clamshell with Resistance - 1 x daily - 7 x weekly - 3 sets - 10 reps  Standing Hip Abduction with Counter Support - 1 x daily - 7 x weekly - 3 sets - 10 reps        Manual Treatments:  PROM / STM / Oscillations-Mobs:  G-I, II, III, IV (PA's, Inf., Post.)  [x] (50913) Provided manual therapy to mobilize proximal hip and LS spine soft tissue/joints for the purpose of modulating pain, promoting relaxation,  increasing ROM, reducing/eliminating soft tissue swelling/inflammation/restriction, improving soft tissue extensibility and allowing for proper ROM for normal function with self care, mobility, lifting and ambulation.      Modalities:   Discussed heat for home    Charges:  Timed Code Treatment Minutes: 30   Total Treatment Minutes: 30       [] EVAL (LOW) 02996 (typically 20 minutes face-to-face)  [] EVAL (MOD) 19862 (typically 30 minutes face-to-face)  [] EVAL (HIGH) 57503 (typically 45 minutes face-to-face)  [] RE-EVAL     [x] UN(04167) x 1    [] IONTO  [x] NMR (95727) x    1 [] VASO  [] Manual (41255) x  1    [] Other:  [] TA x 1     [] Mech Traction (08563)  [] ES(attended) (84605)      [] ES (un) (82235):     GOALS:   Patient stated goal: Return to walking to [employer]    [] Progressing: [x] Met: [] Not Met: [] Adjusted    Therapist goals for Patient:   Short Term Goals: To be achieved in: 2 weeks  1. Independent in HEP and progression per patient tolerance, in order to prevent re-injury. [] Progressing: [x] Met: [] Not Met: [] Adjusted   2. Patient will have a decrease in pain to facilitate improvement in movement, function, and ADLs as indicated by Functional Deficits. [] Progressing: [x] Met: [] Not Met: [] Adjusted    Long Term Goals: To be achieved in: 6 weeks  1. Disability index score of 11% or less for the CONSUELO to assist with reaching prior level of function. [] Progressing: [x] Met: [] Not Met: [] Adjusted  2. Patient will demonstrate increased AROM to WNL, good LS mobility, good hip ROM to allow for proper joint functioning as indicated by patients Functional Deficits. [] Progressing: [x] Met: [] Not Met: [] Adjusted  3. Patient will demonstrate an increase in Strength to good proximal hip and core activation to allow for proper functional mobility as indicated by patients Functional Deficits. [] Progressing: [x] Met: [] Not Met: [] Adjusted  4. Patient will return to ADL and other functional activities without increased symptoms or restriction. [] Progressing: [x] Met: [] Not Met: [] Adjusted  5. Patient will tolerate standing > 15 minutes without pain in his back in order to return to standing at [employer] without pain (patient specific functional goal)    [] Progressing: [x] Met: [] Not Met: [] Adjusted     Overall Progression Towards Functional goals/ Treatment Progress Update:  [x] Patient is progressing as expected towards functional goals listed. [] Progression is slowed due to complexities/Impairments listed. [] Progression has been slowed due to co-morbidities.   [] Plan just implemented, too soon to assess goals progression <30days   [] Goals require adjustment due to lack of progress  [] Patient is not progressing as expected and requires additional follow up with physician  [] Other    Prognosis for POC: [x] Good [] Fair  [] Poor      Patient requires continued skilled intervention: [] Yes  [x] No    Treatment/Activity Tolerance:  [x] Patient able to complete treatment  [] Patient limited by fatigue  [] Patient limited by pain     [] Patient limited by other medical complications  [] Other: Tolerated visit well. Mild fatigue in hips by end of visit from new exercises. Has much improved lumbar ROM without any complaint of pain. Hamstring flexibility also significant improved. Patient reports no pain at rest or with ADLs including standing. Patient has demonstrated a consistent understanding of their HEP and how to progress themself independently. Patient is to be discharged from PT as skilled care is no longer needed. Patient is instructed to call PT with any questions or concerns moving forward. Patient is agreeable with this plan. Patient education:  9/8/21: Patient educated about PT diagnosis, prognosis, and plan of care; educated on role of physical therapy; educated on HEP; educated on anatomy of lumbar spine; discussed pelvic and lumbar positioning for comfort; encouraged use of heat for muscle relaxation and symptom managemen. Prognosis: [x] Good [] Fair  [] Poor    Patient Requires Follow-up: [] Yes  [x] No    PLAN: Discharge to THE [facility]  [] Continue per plan of care [] Alter current plan (see comments)  [] Plan of care initiated [] Hold pending MD visit [x] Discharge    Electronically signed by: Catalina Rosario, PT, DPT, OCS    *If patient does not return for further follow ups after this date. Please consider this as the patients discharge from physical therapy.

## 2022-01-19 ENCOUNTER — TELEPHONE (OUTPATIENT)
Dept: FAMILY MEDICINE CLINIC | Age: 87
End: 2022-01-19

## 2022-01-19 DIAGNOSIS — M54.50 ACUTE EXACERBATION OF CHRONIC LOW BACK PAIN: Primary | ICD-10-CM

## 2022-01-19 DIAGNOSIS — G89.29 ACUTE EXACERBATION OF CHRONIC LOW BACK PAIN: Primary | ICD-10-CM

## 2022-01-19 NOTE — TELEPHONE ENCOUNTER
He is safe to Take Tylenol 500 mg 2 tabs every 6 hours as needed for pain  I persists I can arrange back eval for him let me know

## 2022-01-19 NOTE — TELEPHONE ENCOUNTER
Pt's wife called in and I advised her of the notes. Stated she understood and would keep us updated.

## 2022-01-19 NOTE — TELEPHONE ENCOUNTER
Latosha Mack while getting up on Monday. Pain in back from waist down to knee. Wanting to know if there is anything he needs to do? Anything OTC he can take- magnesium? Calcium? (those were suggested to them) or have something Rx. Please advise.  Pt is reachable at  [phone]

## 2022-01-20 ENCOUNTER — OFFICE VISIT (OUTPATIENT)
Dept: ORTHOPEDIC SURGERY | Age: 87
End: 2022-01-20
Payer: MEDICARE

## 2022-01-20 VITALS — HEIGHT: 71 IN | WEIGHT: 130 LBS | RESPIRATION RATE: 16 BRPM | BODY MASS INDEX: 18.2 KG/M2

## 2022-01-20 DIAGNOSIS — M53.3 SACRAL PAIN: ICD-10-CM

## 2022-01-20 DIAGNOSIS — M54.50 ACUTE BILATERAL LOW BACK PAIN WITHOUT SCIATICA: ICD-10-CM

## 2022-01-20 DIAGNOSIS — R52 PAIN: Primary | ICD-10-CM

## 2022-01-20 DIAGNOSIS — M43.16 SPONDYLOLISTHESIS AT L4-L5 LEVEL: ICD-10-CM

## 2022-01-20 PROCEDURE — 1036F TOBACCO NON-USER: CPT | Performed by: PHYSICIAN ASSISTANT

## 2022-01-20 PROCEDURE — 4040F PNEUMOC VAC/ADMIN/RCVD: CPT | Performed by: PHYSICIAN ASSISTANT

## 2022-01-20 PROCEDURE — 1123F ACP DISCUSS/DSCN MKR DOCD: CPT | Performed by: PHYSICIAN ASSISTANT

## 2022-01-20 PROCEDURE — G8484 FLU IMMUNIZE NO ADMIN: HCPCS | Performed by: PHYSICIAN ASSISTANT

## 2022-01-20 PROCEDURE — G8427 DOCREV CUR MEDS BY ELIG CLIN: HCPCS | Performed by: PHYSICIAN ASSISTANT

## 2022-01-20 PROCEDURE — 99204 OFFICE O/P NEW MOD 45 MIN: CPT | Performed by: PHYSICIAN ASSISTANT

## 2022-01-20 PROCEDURE — G8419 CALC BMI OUT NRM PARAM NOF/U: HCPCS | Performed by: PHYSICIAN ASSISTANT

## 2022-01-20 NOTE — TELEPHONE ENCOUNTER
pts wife is calling back in today about the back pain. States that the pain has not gotten any better. States that she had to get him into a wheely chair last night to get him into the bedroom. She is asking for the eval.     Does he need to come into the office or does he need a referral? Please advise.

## 2022-01-20 NOTE — PROGRESS NOTES
History of present illness:   Mr. Maria D Cox is a pleasant 81 y.o. male kindly referred by Courtney Espino MD for consultation regarding his LBP, sacral pain and bilateral posterior upper thigh pain. He states his pain began suddenly 4 days ago upon getting up and out of bed. When he had went to bed to previous evening he was having this pain which seems different from the typical chronic LBP. His pain has steadily worsened since onset. He did slip and fall the morning of the onset of pain. However, he does not believe that had made his pain any worse. Moody Olguin He rates his back pain 5-6/10 VAS, Buttock pain 5/10 VAS and leg pain or upper posterior thigh pain 6/10 VAS. He describes the pain as dull ache pain. He denies any pain radiating down below his knees. He reports no numbness and tingling into the lower extremities. He reports intermittent weakness of his left and right leg. He denies bowel or bladder dysfunction and saddle anesthesia. He states he can sit or lie down for unlimited amount of time without pain but can only stand stand for a maximum 10 minutes. The pain has not disrupted his sleep. He has tried Tylenol recently without pain relief. She does report fairly significant weight loss over the past couple of years. He has a history of an elevated prostate specific antigen but no history of prostate cancer. He denies any recent fevers or chills. Past medical history:  His past medical history has been reviewed.   Past Medical History:   Diagnosis Date    Allergic rhinitis due to other allergen     Coronary atherosclerosis of native coronary artery     Dyshidrosis     Elevated prostate specific antigen (PSA)     Essential hypertension, benign     Mixed hyperlipidemia     Osteoarthrosis involving, or with mention of more than one site, but not specified as generalized, multiple sites     Pain in joint, shoulder region        His past surgical history has been reviewed. Past Surgical History:   Procedure Laterality Date    CERVICAL FUSION  1990    CORONARY ANGIOPLASTY WITH STENT PLACEMENT  2002    [facility]    CORONARY ARTERY BYPASS GRAFT  2008         His medications and allergies were reviewed. Current Outpatient Medications   Medication Sig Dispense Refill    clopidogrel (PLAVIX) 75 MG tablet Take 75 mg by mouth daily      nitroGLYCERIN (NITROSTAT) 0.4 MG SL tablet Place 0.4 mg under the tongue as needed for Chest pain      aspirin 81 MG EC tablet Take 81 mg by mouth daily.  metoprolol (LOPRESSOR) 25 MG tablet Take 25 mg by mouth 2 times daily. 1/2 by mouth twice a day       simvastatin (ZOCOR) 20 MG tablet Take 20 mg by mouth nightly. No current facility-administered medications for this visit. His social history has been reviewed. Social History     Occupational History    Occupation: writing   Tobacco Use    Smoking status: Never Smoker    Smokeless tobacco: Never Used   Substance and Sexual Activity    Alcohol use: No    Drug use: Not on file    Sexual activity: Not on file         His family history has been reviewed. Family History   Problem Relation Age of Onset    Cancer Mother         breast cancer    Coronary Art Dis Father     Cancer Brother         thyroid         Review of Systems:  I have reviewed the clinically relevant past medical history, medications, allergies, family history, social history, and 13 point Review of Systems from the patient's recent history form & documented any details relevant to today's presenting complaints in the history above. The patient's self-reported past medical history, medications, allergies, family history, social history, and Review of Systems form from today's date have been scanned into the chart under the \"Media\" tab.       Patient's review of symptoms was reviewed and is significant for back pain and  fatigue, chills, visual disturbances, blood in stool or urine, recent infection. Physical examination:  Mr. Alessandra Coreas most recent vitals:  Vitals  Resp: 16  Height: 5' 11\" (180.3 cm)  Weight: 130 lb (59 kg)  Body mass index is 18.13 kg/m². General exam:  He is well-developed and well-nourished, is in obvious discomfort and alert and oriented to person, place, and time. He demonstrates appropriate mood and affect. His skin is warm and dry. His gait is normal and he walks heel to toe without significant limp or instability. Back:  He stands with slight lumbar flexion. His lumbar flexion, extension and lateral bending are moderately reduced with pain. He has mild tenderness over his lumbar spine and sacrum without obvious muscle spasm. The skin over his lumbar spine is normal without a surgical scar. Lower extremities:  He has 5/5 motor strength of bilateral lower extremities. He has a negative straight leg raise, bilaterally. Deep tendon reflexes at knees and achilles are 2+. Sensation is intact to light touch L3 to S1 bilaterally. He has no clonus. Hip range of motion is mildly diminished without pain  Stinchfield test is negative. Abdomen:  Non-tender and non-distended. No rash. Imaging:  X rays was obtained in the office today. AP and lateral lumbar spine:  Mild scoliosis. Multilevel degenerative disc disease and facet arthritis. Grade 1 spondylolisthesis at L4-L5. I am concerned about a possible lytic area within the sacrum seen on the lateral projection of the lumbar spine films. Diagnosis:      ICD-10-CM    1. Pain  R52 XR LUMBAR SPINE (2-3 VIEWS)   2. Acute bilateral low back pain without sciatica  M54.50    3. Spondylolisthesis at L4-L5 level  M43.16 MRI LUMBAR SPINE W WO CONTRAST     GLOMERULAR FILTRATION RATE, ESTIMATED     CANCELED: Walker Medline   4. Sacral pain  M53.3 MRI PELVIS W WO CONTRAST     GLOMERULAR FILTRATION RATE, ESTIMATED          Assessment/ Plan:   This is an 50-year-old gentleman with history of chronic low back pain. His pain worsened and changed approximately 4 days ago upon getting out of bed. He denies any injury at the time of getting out of bed or days prior. He did have a fall in his home on the same day of the increase in change in pain. However, he was experiencing the symptoms prior to this fall. He believes his leg may have given out on him causing him to fall. X-rays show lumbar spondylosis with a grade 1 spondylolisthesis at L4-L5. He remains neurologically intact in both lower extremities. Questionable lytic lesion within the sacrum. He has had weight loss and an elevated PSA without diagnosis of prostate cancer. I had an extensive discussion with Mr. Sydney Cabrera and/or family regarding the natural history, etiology, and long term consequences of his condition. I have presented reasonable alternatives to the patient's proposed care, treatment, and services. Risks and benefits of the treatment options also reviewed in detail. I have outlined a treatment plan with them. He has had full opportunity to ask his questions. I have answered them all to his satisfaction. I feel that Mr. Sydney Cabrera understands our discussion today. New Medications prescribed today-no new medications today. He is fairly comfortable with sitting or lying down. He will continue with Tylenol for pain. He may call the office if pain worsens and we will consider Rx for tramadol. Further Imaging-MRI of the lumbar spine and pelvis with and without gadolinium. GFR will be obtained prior to contrast.    A written prescription for rolling walker to assist with ambulation was provided. Follow up-after MRI    He was instructed to call us emergently if he begins to experience bowel or bladder dysfunction, saddle anesthesia, increasing muscle weakness, or onset/ worsening leg symptoms.                                                 Diedre Blizzard PA-C   [facility]                                         Disclaimer: This note was generated with use of a verbal recognition program (DRAGON) and an attempt was made to check for errors. It is possible that there are still dictated errors within this office note. If so, please bring any significant errors to my attention for an addendum. All efforts were made to ensure that this office note is accurate.

## 2022-01-21 NOTE — TELEPHONE ENCOUNTER
I would advise cheapest/fastest walker is a DRIVE wheeled walker lightweight model on [facility] for $220

## 2022-01-21 NOTE — TELEPHONE ENCOUNTER
[facility] and they were seen yesterday. Patient is getting an MRI and also needs a walker.

## 2022-01-22 ENCOUNTER — HOSPITAL ENCOUNTER (OUTPATIENT)
Dept: MRI IMAGING | Age: 87
Discharge: HOME | End: 2022-01-22
Payer: MEDICARE

## 2022-01-22 DIAGNOSIS — M53.3 SACRAL PAIN: ICD-10-CM

## 2022-01-22 DIAGNOSIS — M43.16 SPONDYLOLISTHESIS AT L4-L5 LEVEL: ICD-10-CM

## 2022-01-22 LAB
GFR AFRICAN AMERICAN: >60
GFR NON-AFRICAN AMERICAN: >60
PERFORMED ON: NORMAL
POC CREATININE: 1 MG/DL (ref 0.8–1.3)
POC SAMPLE TYPE: NORMAL

## 2022-01-22 PROCEDURE — 72197 MRI PELVIS W/O & W/DYE: CPT

## 2022-01-22 PROCEDURE — 6360000004 HC RX CONTRAST MEDICATION: Performed by: PHYSICIAN ASSISTANT

## 2022-01-22 PROCEDURE — 72158 MRI LUMBAR SPINE W/O & W/DYE: CPT

## 2022-01-22 PROCEDURE — 82565 ASSAY OF CREATININE: CPT

## 2022-01-22 PROCEDURE — A9579 GAD-BASE MR CONTRAST NOS,1ML: HCPCS | Performed by: PHYSICIAN ASSISTANT

## 2022-01-22 RX ADMIN — GADOTERIDOL 12 ML: 279.3 INJECTION, SOLUTION INTRAVENOUS at 08:21

## 2022-01-24 ENCOUNTER — TELEPHONE (OUTPATIENT)
Dept: ORTHOPEDIC SURGERY | Age: 87
End: 2022-01-24

## 2022-01-24 DIAGNOSIS — M43.16 SPONDYLOLISTHESIS AT L4-L5 LEVEL: Primary | ICD-10-CM

## 2022-01-24 NOTE — TELEPHONE ENCOUNTER
Called and spoke with Family member, relayed message below. Please place order for walker below and send to [facility].

## 2022-01-25 NOTE — TELEPHONE ENCOUNTER
Order signed and may be faxed to [facility]  However - DRIVE walker likely significantly cheaper to buy direct from [employer] as stated

## 2022-02-14 ENCOUNTER — TELEPHONE (OUTPATIENT)
Dept: FAMILY MEDICINE CLINIC | Age: 87
End: 2022-02-14

## 2022-02-14 NOTE — TELEPHONE ENCOUNTER
Pt's wife called in and stated Pt's speech was a garbled when he woke up this morning. I spoke with Dr Renato Hoffmann and he stated due to Pt's history wife should take him to the ED now. I advised her and she said [phone] she would take him now.

## 2022-02-19 PROBLEM — R52 PAIN: Status: RESOLVED | Noted: 2022-01-20 | Resolved: 2022-02-19

## 2022-02-28 NOTE — PROGRESS NOTES
PROGRESS NOTE     Irma Arboleda [facility] ([facility]  Jorge Haywood [address] [address]  [phone] office  [phone] fax    Date of Service:  3/2/2022     Assessment / Plan:     1. TIA (transient ischemic attack)  Symptoms resolved upon arrival to ER. Work up negative. Patient restarted on plavix and increased statin. No symptoms since. 2. Essential hypertension, benign  Elevated in the office today but improved with recheck. Patient has been on metoprolol for many years. Normal blood pressures at home. 3. Pancytopenia ([facility] [address]  Monitored by hematology. 4. Atherosclerosis of native coronary artery of native heart without angina pectoris  Patient is establishing with new cardiologist. On aspirin, plavix and statin. 5. CKD (chronic kidney disease) stage 2, GFR 60-89 ml/min  Stable. Reviewed labs from ER visit. 6. Mixed hyperlipidemia  On atorvastatin. Subjective:      Patient ID: Krysten Valerio is a 81 y.o. male      CC: Hospital follow up    HPI  Patient was admitted to the hospital on 2/14 with slurred speech. CXR was normal. Head CT and MRI showed no acute abnormality. He was started on statin. On aspirin due to recent cardiac stent. His plavix had been stopped but restarted for 21 days. He sees a hematologist who monitors his low platelets. Patient does monitor his blood pressure at home and reports 120/70s. Reviewed labs from [facility]. CBC showed H&H of 11.6/34.7. BMP showed BUN/Creat 12/1. 34. Lipids Cholesterol 81 and LDL 30. EKG showed sinus rhythm with PVCs.        Vitals:    03/02/22 1029 03/02/22 1048 03/02/22 1050   BP: (!) 169/89 (!) 148/84    Pulse: (!) 49  56   SpO2: 97%     Weight: 136 lb (61.7 kg)     Height: 5' 11\" (1.803 m)         Outpatient Medications Marked as Taking for the 3/2/22 encounter (Office Visit) with OTTONIEL Marcus CNP   Medication Sig Dispense Refill    atorvastatin (LIPITOR) 80 MG tablet Take 80 mg by mouth nightly      clopidogrel (PLAVIX) 75 MG tablet Take 75 mg by mouth daily      nitroGLYCERIN (NITROSTAT) 0.4 MG SL tablet Place 0.4 mg under the tongue as needed for Chest pain      aspirin 81 MG EC tablet Take 81 mg by mouth daily.  metoprolol (LOPRESSOR) 25 MG tablet Take 25 mg by mouth 2 times daily.  1/2 by mouth twice a day          Past Medical History:   Diagnosis Date    Allergic rhinitis due to other allergen     Coronary atherosclerosis of native coronary artery     Dyshidrosis     Elevated prostate specific antigen (PSA)     Essential hypertension, benign     Mixed hyperlipidemia     Osteoarthrosis involving, or with mention of more than one site, but not specified as generalized, multiple sites     Pain in joint, shoulder region     TIA (transient ischemic attack)        Past Surgical History:   Procedure Laterality Date    CERVICAL FUSION  1990    CORONARY ANGIOPLASTY WITH STENT PLACEMENT  2002    [facility]    CORONARY ARTERY BYPASS GRAFT  2008       Social History     Tobacco Use    Smoking status: Former Smoker     Packs/day: 1.00     Years: 10.00     Pack years: 10.00     Types: Cigarettes    Smokeless tobacco: Never Used   Substance Use Topics    Alcohol use: No       Family History   Problem Relation Age of Onset    Cancer Mother         breast cancer    Coronary Art Dis Father     Cancer Brother         thyroid           Review of Systems  Constitutional:  Negative for activity or appetite change, fever or fatigue  HENT:  Negative for congestion,sinus pressure, or rhinorrhea  Eyes:  Negative for eye pain or visual changes  Resp:  Negative for SOB, chest tightness, cough  Cardiovascular: Negative for CP, palpitations, DE LA PAZ, orthopnea, PND, LE edema  Gastrointestinal: Negative for abd pain, melena, BRBPR, N/V/D  Endocrine:  Negative for polydipsia and polyuria  :  Negative for dysuria, flank pain or urinary frequency  Musculoskeletal:  Negative for back pain or

## 2022-03-02 ENCOUNTER — OFFICE VISIT (OUTPATIENT)
Dept: FAMILY MEDICINE CLINIC | Age: 87
End: 2022-03-02
Payer: MEDICARE

## 2022-03-02 VITALS
BODY MASS INDEX: 19.04 KG/M2 | HEART RATE: 56 BPM | SYSTOLIC BLOOD PRESSURE: 148 MMHG | OXYGEN SATURATION: 97 % | HEIGHT: 71 IN | DIASTOLIC BLOOD PRESSURE: 84 MMHG | WEIGHT: 136 LBS

## 2022-03-02 DIAGNOSIS — E78.2 MIXED HYPERLIPIDEMIA: ICD-10-CM

## 2022-03-02 DIAGNOSIS — I10 ESSENTIAL HYPERTENSION, BENIGN: Primary | ICD-10-CM

## 2022-03-02 DIAGNOSIS — D61.818 PANCYTOPENIA (HCC): ICD-10-CM

## 2022-03-02 DIAGNOSIS — G45.9 TIA (TRANSIENT ISCHEMIC ATTACK): ICD-10-CM

## 2022-03-02 DIAGNOSIS — N18.2 CKD (CHRONIC KIDNEY DISEASE) STAGE 2, GFR 60-89 ML/MIN: ICD-10-CM

## 2022-03-02 DIAGNOSIS — I25.10 ATHEROSCLEROSIS OF NATIVE CORONARY ARTERY OF NATIVE HEART WITHOUT ANGINA PECTORIS: ICD-10-CM

## 2022-03-02 PROBLEM — D46.9 MYELODYSPLASTIC SYNDROME (HCC): Status: ACTIVE | Noted: 2022-03-02

## 2022-03-02 PROBLEM — D69.6 THROMBOCYTOPENIA (HCC): Status: ACTIVE | Noted: 2022-03-02

## 2022-03-02 PROBLEM — R47.01 EXPRESSIVE APHASIA: Status: ACTIVE | Noted: 2022-02-14

## 2022-03-02 PROBLEM — R23.0 CYANOSIS: Status: ACTIVE | Noted: 2022-03-02

## 2022-03-02 PROCEDURE — 1036F TOBACCO NON-USER: CPT | Performed by: NURSE PRACTITIONER

## 2022-03-02 PROCEDURE — G8427 DOCREV CUR MEDS BY ELIG CLIN: HCPCS | Performed by: NURSE PRACTITIONER

## 2022-03-02 PROCEDURE — G8420 CALC BMI NORM PARAMETERS: HCPCS | Performed by: NURSE PRACTITIONER

## 2022-03-02 PROCEDURE — G8484 FLU IMMUNIZE NO ADMIN: HCPCS | Performed by: NURSE PRACTITIONER

## 2022-03-02 PROCEDURE — 4040F PNEUMOC VAC/ADMIN/RCVD: CPT | Performed by: NURSE PRACTITIONER

## 2022-03-02 PROCEDURE — 1123F ACP DISCUSS/DSCN MKR DOCD: CPT | Performed by: NURSE PRACTITIONER

## 2022-03-02 PROCEDURE — 99213 OFFICE O/P EST LOW 20 MIN: CPT | Performed by: NURSE PRACTITIONER

## 2022-03-02 RX ORDER — ATORVASTATIN CALCIUM 80 MG/1
80 TABLET, FILM COATED ORAL NIGHTLY
COMMUNITY
Start: 2022-02-15 | End: 2022-04-20

## 2022-04-08 ENCOUNTER — TELEPHONE (OUTPATIENT)
Dept: FAMILY MEDICINE CLINIC | Age: 87
End: 2022-04-08

## 2022-04-20 ENCOUNTER — OFFICE VISIT (OUTPATIENT)
Dept: FAMILY MEDICINE CLINIC | Age: 87
End: 2022-04-20
Payer: MEDICARE

## 2022-04-20 VITALS — WEIGHT: 136 LBS | HEIGHT: 71 IN | BODY MASS INDEX: 19.04 KG/M2

## 2022-04-20 DIAGNOSIS — Z00.00 INITIAL MEDICARE ANNUAL WELLNESS VISIT: Primary | ICD-10-CM

## 2022-04-20 PROCEDURE — G0438 PPPS, INITIAL VISIT: HCPCS | Performed by: NURSE PRACTITIONER

## 2022-04-20 PROCEDURE — 1123F ACP DISCUSS/DSCN MKR DOCD: CPT | Performed by: NURSE PRACTITIONER

## 2022-04-20 PROCEDURE — 4040F PNEUMOC VAC/ADMIN/RCVD: CPT | Performed by: NURSE PRACTITIONER

## 2022-04-20 ASSESSMENT — LIFESTYLE VARIABLES
HOW MANY STANDARD DRINKS CONTAINING ALCOHOL DO YOU HAVE ON A TYPICAL DAY: 1 OR 2
HOW OFTEN DO YOU HAVE A DRINK CONTAINING ALCOHOL: NEVER

## 2022-04-20 ASSESSMENT — PATIENT HEALTH QUESTIONNAIRE - PHQ9
SUM OF ALL RESPONSES TO PHQ QUESTIONS 1-9: 0
SUM OF ALL RESPONSES TO PHQ9 QUESTIONS 1 & 2: 0
1. LITTLE INTEREST OR PLEASURE IN DOING THINGS: 0
2. FEELING DOWN, DEPRESSED OR HOPELESS: 0
SUM OF ALL RESPONSES TO PHQ QUESTIONS 1-9: 0

## 2022-04-20 NOTE — PROGRESS NOTES
Medicare Annual Wellness Visit    Carlie Flores is here for Medicare AWV    Assessment & Plan   Initial Medicare annual wellness visit      Recommendations for Preventive Services Due: see orders and patient instructions/AVS.  Recommended screening schedule for the next 5-10 years is provided to the patient in written form: see Patient Instructions/AVS.     Return for Medicare Annual Wellness Visit in 1 year. Subjective   No complaints today. Patient's complete Health Risk Assessment and screening values have been reviewed and are found in Flowsheets. The following problems were reviewed today and where indicated follow up appointments were made and/or referrals ordered. Positive Risk Factor Screenings with Interventions:    Fall Risk:  Do you feel unsteady or are you worried about falling? : (!) yes  2 or more falls in past year?: no  Fall with injury in past year?: no     Fall Risk Interventions:    · Home safety tips provided  · Patient declines any further evaluation/treatment for this issue    Cognitive:   Words recalled: 0 Words Recalled  Clock Drawing Test (CDT): Normal  Total Score Interpretation: Abnormal Mini-Cog  Did the patient refuse to take the cognition test?: No    Cognitive Impairment Interventions:  · Patient declines any further evaluation/treatment for cognitive impairment           General Health and ACP:  General  In general, how would you say your health is?: Very Good  In the past 7 days, have you experienced any of the following: New or Increased Pain, New or Increased Fatigue, Loneliness, Social Isolation, Stress or Anger?: (!) Yes  Select all that apply: (!) New or Increased Pain  Do you get the social and emotional support that you need?: Yes  Do you have a Living Will?: (!) No    Advance Directives     Power of  Living Will ACP-Advance Directive ACP-Power of     Not on File Not on File Not on File Not on File      General Health Risk Interventions:  · Pain issues: Patient has seen spine specialist in the past but declined epidural injections that were recommended. · No Living Will: Advance Care Planning addressed with patient today and he does have health care POA. Health Habits/Nutrition:     Physical Activity: Inactive    Days of Exercise per Week: 0 days    Minutes of Exercise per Session: 0 min     Have you lost any weight without trying in the past 3 months?: No  Body mass index: 18.97  Have you seen the dentist within the past year?: Yes    Health Habits/Nutrition Interventions:  · Nutritional issues:  N/A    Hearing/Vision:  Do you or your family notice any trouble with your hearing that hasn't been managed with hearing aids?: (!) Yes  Do you have difficulty driving, watching TV, or doing any of your daily activities because of your eyesight?: No  Have you had an eye exam within the past year?: (!) No  No exam data present    Hearing/Vision Interventions:  · Hearing concerns:  has hearing aids  · Vision concerns:  patient encouraged to make appointment with his/her eye specialist            Objective   Vitals:    04/20/22 1053   Weight: 136 lb (61.7 kg)   Height: 5' 11\" (1.803 m)      Body mass index is 18.97 kg/m². Allergies   Allergen Reactions    Altace [Ramipril]     Ramipril Swelling     Tongue swelling    Meperidine Other (See Comments)     Dizziness, low BP     Prior to Visit Medications    Medication Sig Taking? Authorizing Provider   nitroGLYCERIN (NITROSTAT) 0.4 MG SL tablet Place 0.4 mg under the tongue as needed for Chest pain Yes Historical Provider, MD   aspirin 81 MG EC tablet Take 81 mg by mouth daily. Yes Historical Provider, MD   metoprolol (LOPRESSOR) 25 MG tablet Take 25 mg by mouth 2 times daily.  1/2 by mouth twice a day  Yes Historical Provider, MD   simvastatin (ZOCOR) 20 MG tablet Take 20 mg by mouth nightly   Yes Historical Provider, MD Qureshi (Including outside providers/suppliers regularly involved in providing care):   Patient Care Team:  Dimitry Joy MD as PCP - General (Family Medicine)  Chelsy Godinez MD as PCP - Hematology/Oncology (Hematology and Oncology)  Dimitry Joy MD as PCP - [facility] Empaneled Provider  Naila Mullen as  (Hematology and Oncology)    Reviewed and updated this visit:  Tobacco  Allergies  Meds  Med Hx  Surg Hx  Soc Hx  Fam Hx

## 2022-04-20 NOTE — PATIENT INSTRUCTIONS
Personalized Preventive Plan for Carlie Flores - 4/20/2022  Medicare offers a range of preventive health benefits. Some of the tests and screenings are paid in full while other may be subject to a deductible, co-insurance, and/or copay. Some of these benefits include a comprehensive review of your medical history including lifestyle, illnesses that may run in your family, and various assessments and screenings as appropriate. After reviewing your medical record and screening and assessments performed today your provider may have ordered immunizations, labs, imaging, and/or referrals for you. A list of these orders (if applicable) as well as your Preventive Care list are included within your After Visit Summary for your review. Other Preventive Recommendations:    · A preventive eye exam performed by an eye specialist is recommended every 1-2 years to screen for glaucoma; cataracts, macular degeneration, and other eye disorders. · A preventive dental visit is recommended every 6 months. · Try to get at least 150 minutes of exercise per week or 10,000 steps per day on a pedometer . · Order or download the FREE \"Exercise & Physical Activity: Your Everyday Guide\" from [employer]. Call 5-502.291.7346 or search [employer] online. · You need 7181-4015 mg of calcium and 3258-2917 IU of vitamin D per day. It is possible to meet your calcium requirement with diet alone, but a vitamin D supplement is usually necessary to meet this goal.  · When exposed to the sun, use a sunscreen that protects against both UVA and UVB radiation with an SPF of 30 or greater. Reapply every 2 to 3 hours or after sweating, drying off with a towel, or swimming. · Always wear a seat belt when traveling in a car. Always wear a helmet when riding a bicycle or motorcycle.

## 2022-05-31 ENCOUNTER — TELEPHONE (OUTPATIENT)
Dept: FAMILY MEDICINE CLINIC | Age: 87
End: 2022-05-31

## 2022-05-31 NOTE — TELEPHONE ENCOUNTER
Wife calling. Spoke with on call doctor on Sunday due to rash. Was advised to have him seen in the office today, no availability. Please advise.  Please call back at [phone]

## 2022-05-31 NOTE — TELEPHONE ENCOUNTER
Patient progressively losing weight 5/22/22 136 lb, 114 lb today. Temo Patel patient's daughter is reporting extreme weakness, lethargy, and weight loss. Daughter wanting to know what she needs to do to get patient started on Home care as his declining. Reports normal appetite. And abnormal bowel movement .     Rash is reported on trunk and arms, scaly, red per daughter mimics a bruise, patient reports of discomfort redness     Patient's wife wanting to have home care orders placed    John Falcon wants to be contacted ok per [facility]

## 2022-06-01 ENCOUNTER — TELEMEDICINE (OUTPATIENT)
Dept: FAMILY MEDICINE CLINIC | Age: 87
End: 2022-06-01
Payer: MEDICARE

## 2022-06-01 DIAGNOSIS — D46.9 MYELODYSPLASTIC SYNDROME (HCC): Primary | ICD-10-CM

## 2022-06-01 PROCEDURE — G8428 CUR MEDS NOT DOCUMENT: HCPCS | Performed by: FAMILY MEDICINE

## 2022-06-01 PROCEDURE — G8420 CALC BMI NORM PARAMETERS: HCPCS | Performed by: FAMILY MEDICINE

## 2022-06-01 PROCEDURE — 99214 OFFICE O/P EST MOD 30 MIN: CPT | Performed by: FAMILY MEDICINE

## 2022-06-01 PROCEDURE — 1036F TOBACCO NON-USER: CPT | Performed by: FAMILY MEDICINE

## 2022-06-01 PROCEDURE — 1123F ACP DISCUSS/DSCN MKR DOCD: CPT | Performed by: FAMILY MEDICINE

## 2022-06-01 NOTE — PROGRESS NOTES
2022    Singh Membreno (:  1935) is a 81 y.o. male, here for evaluation of the following chief complaint(s):  No chief complaint on file. ASSESSMENT/PLAN:     Diagnosis Orders   1. Myelodysplastic syndrome [facility])      consult [facility] for outpt management at home per family's request and I concur death within 6 months is potential.       No follow-ups on file. An electronic signature was used to authenticate this note. @SIG@    SUBJECTIVE/OBJECTIVE:  (NOTE : prior results listed below reviewed at this visit to assist in medical decision making.)    HPI / ROS    # Hospice consideration requested by wife Maria Elena. Pt has MDS and advanced age and is progressive losing weight ( ~ 20 lbs in just a few weeks per daughter; down to 110's now at home. Pt and family would prefer no more hospital. COVID positive . He was seen yesterday at [address] [place] [address] ED for rash and weight loss. Given benadryl there. Withdrawal; not eating  Mch; sleeps a lot. Some skin issues. [facility] issue : CAD, MDS, FTT      25+ min d/w wife and Hospice consult initiated. Wt Readings from Last 3 Encounters:   22 136 lb (61.7 kg)   22 136 lb (61.7 kg)   22 130 lb (59 kg)       BP Readings from Last 3 Encounters:   22 (!) 148/84   21 124/68   20 (!) 148/81             TELEHEALTH EVALUATION -- Audio/Visual (During [facility]- public health emergency)      Singh Membreno (:  1935) is being evaluated by a Virtual Visit (video visit) encounter to address concerns as mentioned above. A caregiver was present when appropriate. Due to this being a TeleHealth encounter (During [facility]- public health emergency), evaluation of the following organ systems was limited: Vitals/Constitutional/EENT/Resp/CV/GI//MS/Neuro/Skin/Heme-Lymph-Imm.   Pursuant to the emergency declaration under the [facility] waiver authority and the Coronavirus Preparedness and Response Supplemental Appropriations Act, this Virtual Visit was conducted with patient's (and/or legal guardian's) consent, to reduce the patient's risk of exposure to COVID-19 and provide necessary medical care. The patient (and/or legal guardian) has also been advised to contact this office for worsening conditions or problems, and seek emergency medical treatment and/or call 911 if deemed necessary. Patient identification was verified at the start of the visit: Yes    Total time spent on this encounter: Not billed by time    Services were provided through a video synchronous discussion virtually to substitute for in-person clinic visit. Patient and provider were located at their individual homes. --Regina Vickers MD on 6/2/2022 at 4:03 PM    An electronic signature was used to authenticate this note.

## 2022-06-03 ENCOUNTER — TELEPHONE (OUTPATIENT)
Dept: FAMILY MEDICINE CLINIC | Age: 87
End: 2022-06-03

## 2022-06-03 DIAGNOSIS — D46.9 MYELODYSPLASTIC SYNDROME (HCC): ICD-10-CM

## 2022-06-03 DIAGNOSIS — N18.2 CKD (CHRONIC KIDNEY DISEASE) STAGE 2, GFR 60-89 ML/MIN: Primary | ICD-10-CM

## 2022-06-03 DIAGNOSIS — D69.6 THROMBOCYTOPENIA (HCC): ICD-10-CM

## 2022-06-03 DIAGNOSIS — I25.10 ATHEROSCLEROSIS OF NATIVE CORONARY ARTERY OF NATIVE HEART WITHOUT ANGINA PECTORIS: ICD-10-CM

## 2022-06-03 DIAGNOSIS — R62.7 FTT (FAILURE TO THRIVE) IN ADULT: ICD-10-CM

## 2022-06-03 NOTE — TELEPHONE ENCOUNTER
See referral - pls call [facility] and ask them to put Dasha Ivy on [address] at home care for diagnoses as linked; provide snapshot/med list      Call wife Sade Orellana inform her that contact is imminent; request they contact Sade Orellana soon

## 2022-06-06 ENCOUNTER — TELEPHONE (OUTPATIENT)
Dept: FAMILY MEDICINE CLINIC | Age: 87
End: 2022-06-06

## 2022-06-06 NOTE — TELEPHONE ENCOUNTER
[facility]  [phone]    Shingles on right shoulder. Pt states that itching but family says he has some pain.  Family is asking for a cream.     Suggested benedryl and hydrocortizone didn't know if dr Karl Anaya had any other suggestions

## 2022-06-07 NOTE — TELEPHONE ENCOUNTER
Per Shamika [phone] patient was seen 7 days ago for rash. Relayed medical POC to Shamika.     Shamika noted understanding     Call complete

## 2022-06-07 NOTE — TELEPHONE ENCOUNTER
1) How long has singles rash been present ?  If less than 3 days start valacyclovir 500 mg TID x 7 days    2) Can use neurontin 100 mg TID prn for pain    3) Can use hydrocortisone 1% OTC twice daily for itch

## 2022-08-03 PROBLEM — Z51.5 HOSPICE CARE: Status: ACTIVE | Noted: 2022-06-04

## 2022-08-08 ENCOUNTER — TELEPHONE (OUTPATIENT)
Dept: FAMILY MEDICINE CLINIC | Age: 87
End: 2022-08-08

## 2022-08-08 NOTE — TELEPHONE ENCOUNTER
[address] [facility]  [phone]    Admitted to 6-4 for protein and malnutriion. Gained 10lbs. 124 is currently weight. Albumin in normal limitis 19. States that they are discharging unless dr Ashish Valdez wants to palliative care.

## 2022-09-22 ENCOUNTER — TELEPHONE (OUTPATIENT)
Dept: FAMILY MEDICINE CLINIC | Age: 87
End: 2022-09-22

## 2022-09-22 NOTE — TELEPHONE ENCOUNTER
I reviewed his hospitalization record. I would defer that question to the cardiologist who saw him. Name is Dr. Elena Galeas. I would call his office and see if they still recommend him doing the event monitor. Please document call and then close encounter.   thanks

## 2022-09-22 NOTE — TELEPHONE ENCOUNTER
[facility] and notified will discuss with cardiologist office.

## 2022-09-22 NOTE — TELEPHONE ENCOUNTER
Pt's wife called in stating Pt was released from the hospital last week and was told he would go home with a monitor but upon release they stated his heart went back so he did not need one. Then low and behold they got one via [employer] yesterday. Pt is wondering if he needs to use this. Pt state he has an appt with Dr. Marilee Ba next week. Please advise.  Wife is reachable at [phone]

## 2022-09-27 ENCOUNTER — OFFICE VISIT (OUTPATIENT)
Dept: FAMILY MEDICINE CLINIC | Age: 87
End: 2022-09-27
Payer: MEDICARE

## 2022-09-27 VITALS
WEIGHT: 128 LBS | HEART RATE: 72 BPM | OXYGEN SATURATION: 100 % | DIASTOLIC BLOOD PRESSURE: 76 MMHG | SYSTOLIC BLOOD PRESSURE: 138 MMHG | BODY MASS INDEX: 17.85 KG/M2 | RESPIRATION RATE: 16 BRPM

## 2022-09-27 DIAGNOSIS — R41.0 DISORIENTATION: Primary | ICD-10-CM

## 2022-09-27 DIAGNOSIS — R62.7 FAILURE TO THRIVE IN ADULT: ICD-10-CM

## 2022-09-27 PROCEDURE — 1036F TOBACCO NON-USER: CPT | Performed by: FAMILY MEDICINE

## 2022-09-27 PROCEDURE — G8427 DOCREV CUR MEDS BY ELIG CLIN: HCPCS | Performed by: FAMILY MEDICINE

## 2022-09-27 PROCEDURE — G9692 HOSP RECD BY PT DUR MSMT PER: HCPCS | Performed by: FAMILY MEDICINE

## 2022-09-27 PROCEDURE — 99214 OFFICE O/P EST MOD 30 MIN: CPT | Performed by: FAMILY MEDICINE

## 2022-09-27 PROCEDURE — G8419 CALC BMI OUT NRM PARAM NOF/U: HCPCS | Performed by: FAMILY MEDICINE

## 2022-09-27 RX ORDER — ATORVASTATIN CALCIUM 80 MG/1
80 TABLET, FILM COATED ORAL NIGHTLY
COMMUNITY
Start: 2022-09-16 | End: 2022-11-15

## 2022-09-27 ASSESSMENT — PATIENT HEALTH QUESTIONNAIRE - PHQ9
SUM OF ALL RESPONSES TO PHQ9 QUESTIONS 1 & 2: 0
SUM OF ALL RESPONSES TO PHQ QUESTIONS 1-9: 0
1. LITTLE INTEREST OR PLEASURE IN DOING THINGS: 0
SUM OF ALL RESPONSES TO PHQ QUESTIONS 1-9: 0
2. FEELING DOWN, DEPRESSED OR HOPELESS: 0

## 2022-09-27 NOTE — PROGRESS NOTES
2022    Patricia Toro (:  1935) is a 81 y.o. male, here for evaluation of the following chief complaint(s):  Follow-Up from Hospital ([facility] - due to unresponsive at home )      ASSESSMENT/PLAN:     Diagnosis Orders   1. Disorientation      resolved. Continue [facility] Palliative Care      2. Failure to thrive in adult      weight has improved 110lbs --> 124 lbs under Hospice care; he has adiel discharged and advised Palliative Care          Return if symptoms worsen or fail to improve. An electronic signature was used to authenticate this note. SUBJECTIVE/OBJECTIVE:  (NOTE : prior results listed below reviewed at this visit to assist in medical decision making.)    HPI / ROS    HFU mental status changes discharged 11 days ago. Wife questions need for statn jenna given advanced age and muscle weakness and Hospice [facility] Care qualifying condition. Chat reviewed with extended testing as below :    Hospital Course: The patient is a(n) 81year old male who was admitted for acute mental status changes, metabolic encephalopathy and being found down at home. CT head showed no acute abnormalities. 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 Neurology to the imaging did not believe there to be any acute infarct. EEG was obtained and was negative for epileptiform discharges. Telemetry showed atrial fibrillation, which was believed to be the etiology of his syncope/loss of consciousness. Cardiology was consulted and recommended rate control with beta-blocker. Cardiology recommended against anticoagulation because he is a high fall risk. Echocardiogram was ordered and showed normal EF, mild mitral/aortic regurgitation. Patient had an YAYA/lactic acidosis that improved with IV fluids. IV fluids were discontinued . The YAYA was believed to be due to rhabdo.   Due to malnutrition and history of weight loss, nutrition was consulted and gave the recommendations. Patient's CODE STATUS was DNR CCA and palliative care was consulted. # MDS follow Onc    # Palliative care - I advise I believe the risks of muscle fatigue, pain, and weakness outweigh the benefits of restarting any statin and my advice is not to take this med.  He is asked to also d/w Cardiology in weeks         Wt Readings from Last 3 Encounters:   09/27/22 128 lb (58.1 kg)   04/20/22 136 lb (61.7 kg)   03/02/22 136 lb (61.7 kg)       BP Readings from Last 3 Encounters:   09/27/22 138/76   03/02/22 (!) 148/84   08/06/21 124/68       PHYSICAL EXAM  Vitals:    09/27/22 0939   BP: 138/76   Site: Left Upper Arm   Position: Sitting   Cuff Size: Medium Adult   Pulse: 72   Resp: 16   SpO2: 100%   Weight: 128 lb (58.1 kg)     A&o frail elderly male  Car reg   Lungs cta  Ext no edema  Skin no jaundice  Eyes anicteric

## 2022-11-14 ENCOUNTER — TELEPHONE (OUTPATIENT)
Dept: FAMILY MEDICINE CLINIC | Age: 87
End: 2022-11-14

## 2022-11-14 NOTE — TELEPHONE ENCOUNTER
Pt's wife called stating they spoke with Dr. Mike Steven on the weekend call line and was told to call back and update today. Pt still has the tingling in his hand and up his arm. Pt slipped and fell this morning when transferring from the shower chair to his walker he was able to catch himself so he did not go crashing down. Pt did spill coffee this morning trying to use his left hand as well. Please advise.  [phone]

## 2022-11-15 ENCOUNTER — OFFICE VISIT (OUTPATIENT)
Dept: FAMILY MEDICINE CLINIC | Age: 87
End: 2022-11-15
Payer: MEDICARE

## 2022-11-15 VITALS
DIASTOLIC BLOOD PRESSURE: 88 MMHG | RESPIRATION RATE: 16 BRPM | OXYGEN SATURATION: 100 % | SYSTOLIC BLOOD PRESSURE: 136 MMHG | HEART RATE: 51 BPM | BODY MASS INDEX: 18.41 KG/M2 | WEIGHT: 132 LBS

## 2022-11-15 DIAGNOSIS — R20.2 NUMBNESS AND TINGLING OF LEFT UPPER EXTREMITY: Primary | ICD-10-CM

## 2022-11-15 DIAGNOSIS — R20.0 NUMBNESS AND TINGLING OF LEFT UPPER EXTREMITY: Primary | ICD-10-CM

## 2022-11-15 PROCEDURE — 99213 OFFICE O/P EST LOW 20 MIN: CPT | Performed by: FAMILY MEDICINE

## 2022-11-15 PROCEDURE — G8427 DOCREV CUR MEDS BY ELIG CLIN: HCPCS | Performed by: FAMILY MEDICINE

## 2022-11-15 PROCEDURE — G9692 HOSP RECD BY PT DUR MSMT PER: HCPCS | Performed by: FAMILY MEDICINE

## 2022-11-15 PROCEDURE — G8419 CALC BMI OUT NRM PARAM NOF/U: HCPCS | Performed by: FAMILY MEDICINE

## 2022-11-15 PROCEDURE — G8484 FLU IMMUNIZE NO ADMIN: HCPCS | Performed by: FAMILY MEDICINE

## 2022-11-15 PROCEDURE — 1036F TOBACCO NON-USER: CPT | Performed by: FAMILY MEDICINE

## 2022-11-15 ASSESSMENT — PATIENT HEALTH QUESTIONNAIRE - PHQ9
SUM OF ALL RESPONSES TO PHQ9 QUESTIONS 1 & 2: 0
SUM OF ALL RESPONSES TO PHQ QUESTIONS 1-9: 0
SUM OF ALL RESPONSES TO PHQ QUESTIONS 1-9: 0
1. LITTLE INTEREST OR PLEASURE IN DOING THINGS: 0
SUM OF ALL RESPONSES TO PHQ QUESTIONS 1-9: 0
2. FEELING DOWN, DEPRESSED OR HOPELESS: 0
SUM OF ALL RESPONSES TO PHQ QUESTIONS 1-9: 0

## 2022-11-15 NOTE — PROGRESS NOTES
11/15/2022    Meche Nance (:  1935) is a 81 y.o. male, here for evaluation of the following chief complaint(s):  Numbness (Started  numbness and tingling of left arm. No injury known)      ASSESSMENT/PLAN:     Diagnosis Orders   1. Numbness and tingling of left upper extremity      we review hx and exam not very consistent with CVA more mild CTS;20+ min including earlier phone consultation; decide not to pursue agggressively (palliative)          No follow-ups on file. An electronic signature was used to authenticate this note. SUBJECTIVE/OBJECTIVE:  (NOTE : prior results listed below reviewed at this visit to assist in medical decision making.)    HPI / ROS    # 3 days left hand tingling. Has not progressed. Wife and I had discussion pros and cons of imaging/ED as he is on Hospice care for MDS, FTT. Aghreed not to pursue ED eval given fiocal and non progressive nature and fact that intervention unlikely.         Wt Readings from Last 3 Encounters:   11/15/22 132 lb (59.9 kg)   22 128 lb (58.1 kg)   22 136 lb (61.7 kg)       BP Readings from Last 3 Encounters:   11/15/22 136/88   22 138/76   22 (!) 148/84       PHYSICAL EXAM  Vitals:    11/15/22 1035   BP: 136/88   Site: Left Upper Arm   Position: Sitting   Cuff Size: Medium Adult   Pulse: 51   Resp: 16   SpO2: 100%   Weight: 132 lb (59.9 kg)     A&o  Ext : very god 5/5 strength of hand on left side and right side; full mobility and control of fingers; no tremor; tinging distributio median spares small finer

## 2023-12-06 ENCOUNTER — TELEPHONE (OUTPATIENT)
Dept: FAMILY MEDICINE CLINIC | Age: 88
End: 2023-12-06

## 2023-12-06 NOTE — TELEPHONE ENCOUNTER
Pt's wife called in stating Pt had pain in his elbow, went to ER in [place] and diagnosed with degenerative joint disease, still in horrible pain. Pt would really like to see Dr Bony Russell. Nothing available soon please advise.  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 Blood culture grew gm -ve bacteria, pending final result.

## 2023-12-11 ENCOUNTER — OFFICE VISIT (OUTPATIENT)
Dept: FAMILY MEDICINE CLINIC | Age: 88
End: 2023-12-11
Payer: MEDICARE

## 2023-12-11 VITALS
HEART RATE: 70 BPM | OXYGEN SATURATION: 99 % | BODY MASS INDEX: 18.17 KG/M2 | SYSTOLIC BLOOD PRESSURE: 126 MMHG | DIASTOLIC BLOOD PRESSURE: 68 MMHG | HEIGHT: 71 IN | WEIGHT: 129.8 LBS

## 2023-12-11 DIAGNOSIS — M79.601 DIFFUSE PAIN IN RIGHT UPPER EXTREMITY: Primary | ICD-10-CM

## 2023-12-11 DIAGNOSIS — G56.01 CARPAL TUNNEL SYNDROME OF RIGHT WRIST: ICD-10-CM

## 2023-12-11 DIAGNOSIS — Z91.89 DRIVING SAFETY ISSUE: ICD-10-CM

## 2023-12-11 PROCEDURE — 99214 OFFICE O/P EST MOD 30 MIN: CPT | Performed by: FAMILY MEDICINE

## 2023-12-11 PROCEDURE — 36415 COLL VENOUS BLD VENIPUNCTURE: CPT | Performed by: FAMILY MEDICINE

## 2023-12-11 PROCEDURE — G8427 DOCREV CUR MEDS BY ELIG CLIN: HCPCS | Performed by: FAMILY MEDICINE

## 2023-12-11 PROCEDURE — G8419 CALC BMI OUT NRM PARAM NOF/U: HCPCS | Performed by: FAMILY MEDICINE

## 2023-12-11 PROCEDURE — G8484 FLU IMMUNIZE NO ADMIN: HCPCS | Performed by: FAMILY MEDICINE

## 2023-12-11 PROCEDURE — G9692 HOSP RECD BY PT DUR MSMT PER: HCPCS | Performed by: FAMILY MEDICINE

## 2023-12-11 PROCEDURE — 1036F TOBACCO NON-USER: CPT | Performed by: FAMILY MEDICINE

## 2023-12-11 SDOH — ECONOMIC STABILITY: FOOD INSECURITY: WITHIN THE PAST 12 MONTHS, THE FOOD YOU BOUGHT JUST DIDN'T LAST AND YOU DIDN'T HAVE MONEY TO GET MORE.: NEVER TRUE

## 2023-12-11 SDOH — ECONOMIC STABILITY: FOOD INSECURITY: WITHIN THE PAST 12 MONTHS, YOU WORRIED THAT YOUR FOOD WOULD RUN OUT BEFORE YOU GOT MONEY TO BUY MORE.: NEVER TRUE

## 2023-12-11 SDOH — ECONOMIC STABILITY: HOUSING INSECURITY
IN THE LAST 12 MONTHS, WAS THERE A TIME WHEN YOU DID NOT HAVE A STEADY PLACE TO SLEEP OR SLEPT IN A SHELTER (INCLUDING NOW)?: NO

## 2023-12-11 SDOH — ECONOMIC STABILITY: INCOME INSECURITY: HOW HARD IS IT FOR YOU TO PAY FOR THE VERY BASICS LIKE FOOD, HOUSING, MEDICAL CARE, AND HEATING?: NOT HARD AT ALL

## 2023-12-11 ASSESSMENT — PATIENT HEALTH QUESTIONNAIRE - PHQ9
SUM OF ALL RESPONSES TO PHQ QUESTIONS 1-9: 0
2. FEELING DOWN, DEPRESSED OR HOPELESS: 0
SUM OF ALL RESPONSES TO PHQ QUESTIONS 1-9: 0
1. LITTLE INTEREST OR PLEASURE IN DOING THINGS: 0
SUM OF ALL RESPONSES TO PHQ9 QUESTIONS 1 & 2: 0
SUM OF ALL RESPONSES TO PHQ QUESTIONS 1-9: 0
SUM OF ALL RESPONSES TO PHQ QUESTIONS 1-9: 0
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
## 2023-12-12 LAB
T4 FREE SERPL-MCNC: 1.3 NG/DL (ref 0.9–1.8)
TSH SERPL DL<=0.005 MIU/L-ACNC: 2.13 UIU/ML (ref 0.27–4.2)
URATE SERPL-MCNC: 7.2 MG/DL (ref 3.5–7.2)

## 2023-12-12 NOTE — RESULT ENCOUNTER NOTE
Call wife. Pt has elevation of uric acid which suggests recent gout flare.   Let me know how he does on Tylenol 500 mg 2 AM and 2 PM daily  INB 1 week let us know    Thyroid normal

## 2024-01-01 ENCOUNTER — TELEPHONE (OUTPATIENT)
Dept: FAMILY MEDICINE CLINIC | Age: 89
End: 2024-01-01

## 2024-01-01 DIAGNOSIS — M15.0 PRIMARY OSTEOARTHRITIS INVOLVING MULTIPLE JOINTS: Primary | ICD-10-CM

## 2024-01-01 RX ORDER — HYDROCODONE BITARTRATE AND ACETAMINOPHEN 5; 325 MG/1; MG/1
1 TABLET ORAL EVERY 6 HOURS PRN
Qty: 20 TABLET | Refills: 0 | Status: SHIPPED | OUTPATIENT
Start: 2024-01-01 | End: 2024-01-01

## 2024-01-04 ENCOUNTER — HOSPITAL ENCOUNTER (OUTPATIENT)
Dept: PHYSICAL THERAPY | Age: 89
Setting detail: THERAPIES SERIES
Discharge: HOME | End: 2024-01-04
Attending: ORTHOPAEDIC SURGERY
Payer: MEDICARE

## 2024-01-04 DIAGNOSIS — M54.2 NECK PAIN: Primary | ICD-10-CM

## 2024-01-04 PROCEDURE — 97140 MANUAL THERAPY 1/> REGIONS: CPT

## 2024-01-04 PROCEDURE — 97161 PT EVAL LOW COMPLEX 20 MIN: CPT

## 2024-01-04 PROCEDURE — 97110 THERAPEUTIC EXERCISES: CPT

## 2024-01-04 NOTE — PLAN OF CARE
[facility]- Outpatient Rehabilitation and Therapy [address], [place] [address] office: [phone] fax: [phone]     Physical Therapy Certification      Dear Janusz Garner MD,    We had the pleasure of evaluating the following patient for physical therapy services at [facility].  A summary of our findings can be found in the initial assessment below.  This includes our plan of care.  If you have any questions or concerns regarding these findings, please do not hesitate to contact me at the office phone number listed above.  Thank you for the referral.     Physician Signature:_______________________________Date:__________________  By signing above (or electronic signature), therapist’s plan is approved by physician       Physical Therapy: Initial Evaluation   Patient: Levi Fisher (88 y.o. male)   Examination Date: 2024   :  1935 MRN: [id number]   Visit #: 1    Insurance: Payor: MEDICARE / Plan: MEDICARE PART A AND B / Product Type: *No Product type* /   Insurance ID: [id number] - (Medicare)  Secondary Insurance (if applicable): [facility]   Treatment Diagnosis:     ICD-10-CM    1. Neck pain  M54.2          Medical Diagnosis:  Cervical radiculopathy [M54.12]   Referring Physician: Janusz Garner MD  PCP: Gerald Torres MD                              Precautions/ Contra-indications:           Latex allergy:  NO  Pacemaker:    NO  Contraindications for Manipulation: spondylolisthesis (relative)  Date of Surgery: N/A  Other:     Red Flags:  None    C-SSRS Triggered by Intake questionnaire:   [x] No, Questionnaire did not trigger screening.   [] Yes, Patient intake triggered further evaluation      [] C-SSRS Screening completed  [] PCP notified via Plan of Care  [] Emergency services notified     Preferred Language for Healthcare:   [x] English       [] other:    SUBJECTIVE EXAMINATION     Patient stated complaint:  88 year old

## 2024-01-04 NOTE — FLOWSHEET NOTE
[facility]- Outpatient Rehabilitation and Therapy [address], [place] [address] office: [phone] fax: [phone]      Physical Therapy: TREATMENT/PROGRESS NOTE   Patient: Levi Fisher (88 y.o. male)   Treatment Date: 2024   :  1935 MRN: [id number]   Visit #: 1   Insurance Allowable Auth Needed   Medicare []Yes    []No    Insurance: Payor: MEDICARE / Plan: MEDICARE PART A AND B / Product Type: *No Product type* /   Insurance ID: [id number] - (Medicare)  Secondary Insurance (if applicable): [facility]   Treatment Diagnosis:     ICD-10-CM    1. Neck pain  M54.2          Medical Diagnosis:    Cervical radiculopathy [M54.12]   Referring Physician: Janusz Garner MD  PCP: Gerald Torres MD                             Plan of care signed (Y/N): N    Date of Patient follow up with Physician: PRN     Progress Report/POC: EVAL today  POC update due: 2024 (OR 10 visits /OR AUTH LIMITS, whichever is less)    OUTCOME MEASURE DATE % Deficit   NDI 24 2%            Latex Allergy:  [x]NO      []YES  Preferred Language for Healthcare:   [x]English       []other:        SUBJECTIVE EXAMINATION     Pain: 5/10    Patient Report/Comments: see eval        OBJECTIVE EXAMINATION     24  ROM AROM Comments   Flexion 40     Extension 36     Side bend R 20     Side bend L 20     Rotation R 58     Rotation L 52                        24  Strength L R Comments   Neck flexion (C1-2) WNL WNL     Neck side bend (C3) WNL WNL     Shoulder elevation (C4) WNL WNL     Shoulder abduction (C5) WNL WNL     Elbow flexion and /or wrist extension  (C6) WNL WNL     Elbow extension and/or wrist flexion  (C7) WNL WNL     Thumb extension and/or ulnar deviation ((C8) WNL WNL     Abduction and /or adduction of hand intrinsics (T1) WNL WNL                  24  Special Test Results/Comment   Spurling's NT   Cervical Distraction \"Feels good\"   ULTT 1-Median     ULTT 2-Median     ULTT

## 2024-01-11 ENCOUNTER — HOSPITAL ENCOUNTER (OUTPATIENT)
Dept: PHYSICAL THERAPY | Age: 89
Setting detail: THERAPIES SERIES
Discharge: HOME | End: 2024-01-11
Attending: ORTHOPAEDIC SURGERY
Payer: MEDICARE

## 2024-01-11 PROCEDURE — 97140 MANUAL THERAPY 1/> REGIONS: CPT

## 2024-01-11 PROCEDURE — 97110 THERAPEUTIC EXERCISES: CPT

## 2024-01-11 PROCEDURE — 97112 NEUROMUSCULAR REEDUCATION: CPT

## 2024-01-11 NOTE — FLOWSHEET NOTE
[facility]- Outpatient Rehabilitation and Therapy [address], [place] [address] office: [phone] fax: [phone]      Physical Therapy: TREATMENT/PROGRESS NOTE   Patient: Levi Fisher (88 y.o. male)   Treatment Date: 2024   :  1935 MRN: [id number]   Visit #: 2   Insurance Allowable Auth Needed   Medicare []Yes    [x]No    Insurance: Payor: MEDICARE / Plan: MEDICARE PART A AND B / Product Type: *No Product type* /   Insurance ID: [id number] - (Medicare)  Secondary Insurance (if applicable): [facility]   Treatment Diagnosis:     ICD-10-CM    1. Neck pain  M54.2          Medical Diagnosis:    Cervical radiculopathy [M54.12]   Referring Physician: Janusz Garner MD  PCP: Gerald Torres MD                             Plan of care signed (Y/N): Y    Date of Patient follow up with Physician: PRN     Progress Report/POC: No  POC update due: 2024 (OR 10 visits /OR AUTH LIMITS, whichever is less)    OUTCOME MEASURE DATE % Deficit   NDI 24 2%            Latex Allergy:  [x]NO      []YES  Preferred Language for Healthcare:   [x]English       []other:        SUBJECTIVE EXAMINATION     Pain: 1-2/10    Patient Report/Comments: Doing well. No pain at rest. His wife states he has no done his HEP but is using Infrared therapy \"most days\".         OBJECTIVE EXAMINATION     24  ROM AROM Comments   Flexion 40     Extension 36     Side bend R 20     Side bend L 20     Rotation R 58     Rotation L 52                        24  Strength L R Comments   Neck flexion (C1-2) WNL WNL     Neck side bend (C3) WNL WNL     Shoulder elevation (C4) WNL WNL     Shoulder abduction (C5) WNL WNL     Elbow flexion and /or wrist extension  (C6) WNL WNL     Elbow extension and/or wrist flexion  (C7) WNL WNL     Thumb extension and/or ulnar deviation ((C8) WNL WNL     Abduction and /or adduction of hand intrinsics (T1) WNL WNL                  24  Special Test Results/Comment

## 2024-01-18 ENCOUNTER — HOSPITAL ENCOUNTER (OUTPATIENT)
Dept: PHYSICAL THERAPY | Age: 89
Setting detail: THERAPIES SERIES
Discharge: HOME | End: 2024-01-18
Attending: ORTHOPAEDIC SURGERY
Payer: MEDICARE

## 2024-01-18 PROCEDURE — 97530 THERAPEUTIC ACTIVITIES: CPT

## 2024-01-18 PROCEDURE — 97110 THERAPEUTIC EXERCISES: CPT

## 2024-01-18 NOTE — PLAN OF CARE
[facility]- Outpatient Rehabilitation and Therapy [address], [place] [address] office: [phone] fax: [phone]     Physical Therapy Re-Certification Plan of Care    Dear  Dr. Garner,    We had the pleasure of treating the following patient for physical therapy services at [facility].  A summary of our findings can be found in the updated assessment below.  This includes our plan of care.  If you have any questions or concerns regarding these findings, please do not hesitate to contact me at [phone].  Thank you for the referral.     Physician Signature:________________________________Date:__________________  By signing above (or electronic signature), therapist’s plan is approved by physician      Overall Response to Treatment:   [x]Patient is responding well to treatment and improvement is noted with regards  to goals   []Patient should continue to improve in reasonable time if they continue HEP   []Patient has plateaued and is no longer responding to skilled PT intervention    []Patient is getting worse and would benefit from return to referring MD   []Patient unable to adhere to initial POC   [x]Other: Progressed well. Improved his AROM in all planes. Has no pain at rest or ADLs. Emphasized need for compliance with HEP a few times a week. Also discussed possible health benefits of joining [employer] wellness classes. All questions answered. Patient has demonstrated a consistent understanding of their HEP and how to progress themself independently. Patient is to be discharged from PT as skilled care is no longer needed. Patient is instructed to call PT with any questions or concerns moving forward. Patient is agreeable with this plan.      Date range of Visits: 24-24  Total Visits: 3      Physical Therapy: TREATMENT/PROGRESS NOTE   Patient: Levi Fisher (88 y.o. male)   Treatment Date: 2024   :  1935 MRN: [id number]

## 2024-08-05 ENCOUNTER — TELEPHONE (OUTPATIENT)
Dept: FAMILY MEDICINE CLINIC | Age: 89
End: 2024-08-05

## 2024-08-05 NOTE — TELEPHONE ENCOUNTER
Spoke to pt's wife, she said her biggest concern is with fluids getting in his trach. Pt wants to get discharged home. Would you recommend pt getting discharged with hospice or palliative care? With palliative care they would come to his home for therapy.

## 2024-08-05 NOTE — TELEPHONE ENCOUNTER
[facility]. He is due to be discharged tomorrow. Pt's wife is hoping to speak with Dr. Torres today, prior to his release. Please call pt's wife back at [phone]

## 2024-08-06 NOTE — TELEPHONE ENCOUNTER
I would suggest palliative care if he would get more therapy at home. He can transition to Hospice if no benefit occurs.

## 2024-08-12 ENCOUNTER — TELEPHONE (OUTPATIENT)
Dept: FAMILY MEDICINE CLINIC | Age: 89
End: 2024-08-12

## 2024-08-12 NOTE — TELEPHONE ENCOUNTER
1 F2F or VV either is OK may use same day   30 minutes pleasse he is complex     Principal Discharge DX:	Abdominal pain

## 2024-08-12 NOTE — TELEPHONE ENCOUNTER
Lee Ann called in wanting to make hospital follow up with dr alatorre. Offered NP, declined stating that pt was in hospital for 7 days and there are so many questions that have for dr alatorre. Lee Ann is reachable at [phone]

## 2024-10-22 ENCOUNTER — TELEPHONE (OUTPATIENT)
Dept: FAMILY MEDICINE CLINIC | Age: 89
End: 2024-10-22

## 2024-10-22 NOTE — TELEPHONE ENCOUNTER
Fell last week, sprained hand. Since fall, pt has been sleeping a lot more, found him last night wondering outside, he has had some gibberish speech. Wanting to know if you think should have UA done? Please advise. Please call back at [phone]

## 2024-10-23 ENCOUNTER — HOSPITAL ENCOUNTER (OUTPATIENT)
Dept: GENERAL RADIOLOGY | Age: 89
Discharge: HOME | End: 2024-10-23
Attending: FAMILY MEDICINE
Payer: MEDICARE

## 2024-10-23 ENCOUNTER — OFFICE VISIT (OUTPATIENT)
Dept: FAMILY MEDICINE CLINIC | Age: 89
End: 2024-10-23

## 2024-10-23 ENCOUNTER — HOSPITAL ENCOUNTER (OUTPATIENT)
Age: 89
Discharge: HOME | End: 2024-10-23
Payer: MEDICARE

## 2024-10-23 VITALS
WEIGHT: 142 LBS | SYSTOLIC BLOOD PRESSURE: 144 MMHG | HEIGHT: 71 IN | RESPIRATION RATE: 22 BRPM | BODY MASS INDEX: 19.88 KG/M2 | OXYGEN SATURATION: 98 % | HEART RATE: 80 BPM | DIASTOLIC BLOOD PRESSURE: 86 MMHG

## 2024-10-23 DIAGNOSIS — R41.0 CONFUSION: ICD-10-CM

## 2024-10-23 DIAGNOSIS — I25.10 ATHEROSCLEROSIS OF NATIVE CORONARY ARTERY OF NATIVE HEART WITHOUT ANGINA PECTORIS: ICD-10-CM

## 2024-10-23 DIAGNOSIS — R09.89: ICD-10-CM

## 2024-10-23 DIAGNOSIS — F03.90 MULTIFACTORIAL DEMENTIA (HCC): Primary | ICD-10-CM

## 2024-10-23 DIAGNOSIS — D46.9 MYELODYSPLASTIC SYNDROME (HCC): ICD-10-CM

## 2024-10-23 PROCEDURE — 71046 X-RAY EXAM CHEST 2 VIEWS: CPT

## 2024-10-23 RX ORDER — LEVOFLOXACIN 250 MG/1
250 TABLET, FILM COATED ORAL DAILY
Qty: 7 TABLET | Refills: 0 | Status: SHIPPED | OUTPATIENT
Start: 2024-10-23 | End: 2024-10-30

## 2024-10-23 RX ORDER — NITROGLYCERIN 0.4 MG/1
0.4 TABLET SUBLINGUAL PRN
Qty: 25 TABLET | Refills: 0 | Status: SHIPPED | OUTPATIENT
Start: 2024-10-23

## 2024-10-23 NOTE — PROGRESS NOTES
10/23/2024    Levi Fisher (:  1935) is a 89 y.o. male, here for evaluation of the following chief complaint(s):  Altered Mental Status (Seems more confused, hallucinating, wondering at night, increased SOB )      ASSESSMENT/PLAN:     Diagnosis Orders   1. Multifactorial dementia (HCC)  Non [facility] - External Referral To Hospice    we d/w wife daughter AD, VD, poss CAP contributing; UA clean sent cx; check CXR; Hospice evla      2. Confusion  Culture, Urine    POCT Urinalysis no Micro    XR CHEST (2 VW)    CXR and Urine culture; dec re: hospitalization; OK to treat at home poss CAP for now; close f/u      3. Alteration in pulmonary status  XR CHEST (2 VW)    dull to percussion left base check CXR consider Abx      4. Myelodysplastic syndrome (HCC)  Non [facility] - External Referral To Hospice      5. Atherosclerosis of native coronary artery of native heart without angina pectoris  Non [facility] - External Referral To Hospice          No follow-ups on file.    An electronic signature was used to authenticate this note.    SUBJECTIVE/OBJECTIVE:  (NOTE : prior results listed below reviewed at this visit to assist in medical decision making.)    HPI / ROS    # increased confusion .falling recently without other major sx; family worried about uti as has caused same in past    # extended review of progressive memory loss and options w wife and daughter re: ASL    # Hard of Hearing - uses HA but profound to severe per wife          Wt Readings from Last 3 Encounters:   10/23/24 64.4 kg (142 lb)   23 58.9 kg (129 lb 12.8 oz)   11/15/22 59.9 kg (132 lb)       BP Readings from Last 3 Encounters:   10/23/24 (!) 144/86   23 126/68   11/15/22 136/88       PHYSICAL EXAM  Vitals:    10/23/24 1047   BP: (!) 144/86   Pulse: 80   Resp: 22   SpO2: 98%   Weight: 64.4 kg (142 lb)   Height: 1.803 m (5' 11\")     A&pleasantly demented frail elderly man  Car reg 1/6 sys M  Lungs dul to percussion left base  Ext no pitting

## 2024-10-26 ENCOUNTER — TELEPHONE (OUTPATIENT)
Dept: FAMILY MEDICINE CLINIC | Age: 89
End: 2024-10-26

## 2024-10-26 DIAGNOSIS — R52 ACUTE PAIN: Primary | ICD-10-CM

## 2024-10-26 RX ORDER — HYDROCODONE BITARTRATE AND ACETAMINOPHEN 5; 325 MG/1; MG/1
1 TABLET ORAL EVERY 8 HOURS PRN
Qty: 9 TABLET | Refills: 0 | Status: SHIPPED | OUTPATIENT
Start: 2024-10-26 | End: 2024-10-29

## 2024-10-26 NOTE — TELEPHONE ENCOUNTER
On call message. Patient had another fall since being seen by Dr. Torres. He is complaining of neck pain. He is able to move his neck. Hospice referral was placed last week so wife called and they came to evaluate him today but said he was not a candidate. Wife is asking for pain medication. Tylenol has been ineffective. Wife had tylenol with codeine from several years ago and she gave him one dose for the past 2 nights but also not very effective. OK to give 3 day supply of norco. Follow up with Dr. Torres on Monday. Go to ER if any neurological changes or uncontrolled pain.

## 2024-10-29 ENCOUNTER — TELEPHONE (OUTPATIENT)
Dept: FAMILY MEDICINE CLINIC | Age: 89
End: 2024-10-29

## 2024-10-29 DIAGNOSIS — F03.90 MULTIFACTORIAL DEMENTIA (HCC): Primary | ICD-10-CM

## 2024-10-29 NOTE — TELEPHONE ENCOUNTER
Referral placed for palliative care. Agree with that plan.     Please document call and then close encounter.  thanks

## 2024-10-29 NOTE — TELEPHONE ENCOUNTER
Talked to Olivia at [facility] and Lee Ann pt's spouse. Got all the info needed for the referral for palliative care. Referral has been faxed. Lee Ann has been notified. Fax scanned into media.

## 2024-11-01 NOTE — TELEPHONE ENCOUNTER
Lee Ann called in stating that pt has been having neck pain for the last week.and has heard nothing from pallative care. She said that she wanted to take pt to [facility] because they have an on call doctor from [facility] because pt had a neck fusion. She said that she was advised by dr alatorre to keep pt out of hospital if avoidable. She said that she wanted some advice on what she should do. She said pt is in a lot of pain. Lee Ann can be reached at [phone]

## 2024-11-06 NOTE — TELEPHONE ENCOUNTER
Loreto from [facility] called in stating that pt passed away today 11/6/2024 @ 3:41. Fyi for dr alatorre

## 2024-11-11 ENCOUNTER — TELEPHONE (OUTPATIENT)
Dept: FAMILY MEDICINE CLINIC | Age: 89
End: 2024-11-11

## 2024-11-11 NOTE — TELEPHONE ENCOUNTER
Emily called from [facility] called in stating that there was an order placed for pt in September of this year when pt was alive and they said that it needed to be signed. She said that she had received a message October 22nd that we had not received it and she faxed it again the same day. She can be reached at [phone]